# Patient Record
Sex: FEMALE | Race: WHITE | NOT HISPANIC OR LATINO
[De-identification: names, ages, dates, MRNs, and addresses within clinical notes are randomized per-mention and may not be internally consistent; named-entity substitution may affect disease eponyms.]

---

## 2022-09-01 ENCOUNTER — APPOINTMENT (OUTPATIENT)
Dept: OBGYN | Facility: CLINIC | Age: 57
End: 2022-09-01

## 2022-09-01 VITALS
HEIGHT: 65 IN | WEIGHT: 161 LBS | SYSTOLIC BLOOD PRESSURE: 103 MMHG | BODY MASS INDEX: 26.82 KG/M2 | HEART RATE: 78 BPM | DIASTOLIC BLOOD PRESSURE: 62 MMHG

## 2022-09-01 DIAGNOSIS — U07.1 COVID-19: ICD-10-CM

## 2022-09-01 DIAGNOSIS — Z00.00 ENCOUNTER FOR GENERAL ADULT MEDICAL EXAMINATION W/OUT ABNORMAL FINDINGS: ICD-10-CM

## 2022-09-01 PROCEDURE — 99396 PREV VISIT EST AGE 40-64: CPT

## 2022-09-01 NOTE — PLAN
[FreeTextEntry1] : Patient is a 57-year-old  0 last menstrual period 15 years ago\par Patient presents for annual visit,,, denies any complaints\par Physical exam reveals a well-developed well-nourished female in no apparent distress,,, BMI 26\par Heart regular rhythm and rate, lungs clear, breast no mass nontender no skin changes no nipple discharge no adenopathy, abdomen soft nontender no organomegaly\par Pelvic exam shows normal female external genitalia atrophic, vagina no lesions atrophic, cervix flush to the vaginal vault nontender, uterus anteverted small nontender, adnexa no mass nontender.\par Pap smear performed\par Prescription for breast mammogram and bone density given\par Patient states she had colonoscopy in 2022 with negative findings\par Patient states she had COVID diagnosis back in 2020 and then again in 2021,,, denies any residual symptoms or deficits, she has been both vaccinated and boosted\par Benign exam\par Follow-up 1 year or prior to that as needed

## 2022-09-01 NOTE — HISTORY OF PRESENT ILLNESS
[FreeTextEntry1] : Patient 57-year-old  0 last menstrual period 15 years ago\par Patient presents for annual visit,,, denies any complaints

## 2022-09-01 NOTE — PHYSICAL EXAM
[Chaperone Present] : A chaperone was present in the examining room during all aspects of the physical examination [Appropriately responsive] : appropriately responsive [Alert] : alert [No Acute Distress] : no acute distress [No Lymphadenopathy] : no lymphadenopathy [Regular Rate Rhythm] : regular rate rhythm [No Murmurs] : no murmurs [Clear to Auscultation B/L] : clear to auscultation bilaterally [Soft] : soft [Non-tender] : non-tender [Non-distended] : non-distended [No HSM] : No HSM [No Lesions] : no lesions [No Mass] : no mass [Oriented x3] : oriented x3 [Examination Of The Breasts] : a normal appearance [Vulvar Atrophy] : vulvar atrophy [Labia Majora] : normal [Labia Minora] : normal [Atrophy] : atrophy [Normal] : normal [Anteversion] : anteverted [Uterine Adnexae] : normal [FreeTextEntry6] : Masses, nontender, no skin changes, no nipple discharge, no adenopathy. [Tenderness] : nontender [Mass ___ cm] : no uterine mass was palpated

## 2022-09-23 LAB — HPV HIGH+LOW RISK DNA PNL CVX: NOT DETECTED

## 2023-09-12 ENCOUNTER — NON-APPOINTMENT (OUTPATIENT)
Age: 58
End: 2023-09-12

## 2023-09-12 ENCOUNTER — APPOINTMENT (OUTPATIENT)
Dept: OBGYN | Facility: CLINIC | Age: 58
End: 2023-09-12
Payer: COMMERCIAL

## 2023-09-12 VITALS
WEIGHT: 156 LBS | SYSTOLIC BLOOD PRESSURE: 111 MMHG | HEART RATE: 83 BPM | BODY MASS INDEX: 25.99 KG/M2 | DIASTOLIC BLOOD PRESSURE: 65 MMHG | HEIGHT: 65 IN

## 2023-09-12 DIAGNOSIS — R92.2 INCONCLUSIVE MAMMOGRAM: ICD-10-CM

## 2023-09-12 DIAGNOSIS — Z12.39 ENCOUNTER FOR OTHER SCREENING FOR MALIGNANT NEOPLASM OF BREAST: ICD-10-CM

## 2023-09-12 DIAGNOSIS — M85.80 OTHER SPECIFIED DISORDERS OF BONE DENSITY AND STRUCTURE, UNSPECIFIED SITE: ICD-10-CM

## 2023-09-12 DIAGNOSIS — Z01.419 ENCOUNTER FOR GYNECOLOGICAL EXAMINATION (GENERAL) (ROUTINE) W/OUT ABNORMAL FINDINGS: ICD-10-CM

## 2023-09-12 DIAGNOSIS — Z12.4 ENCOUNTER FOR SCREENING FOR MALIGNANT NEOPLASM OF CERVIX: ICD-10-CM

## 2023-09-12 DIAGNOSIS — Z11.51 ENCOUNTER FOR SCREENING FOR HUMAN PAPILLOMAVIRUS (HPV): ICD-10-CM

## 2023-09-12 DIAGNOSIS — Z12.31 ENCOUNTER FOR SCREENING MAMMOGRAM FOR MALIGNANT NEOPLASM OF BREAST: ICD-10-CM

## 2023-09-12 PROCEDURE — 99396 PREV VISIT EST AGE 40-64: CPT

## 2023-09-21 LAB — HPV HIGH+LOW RISK DNA PNL CVX: NOT DETECTED

## 2023-12-06 ENCOUNTER — INPATIENT (INPATIENT)
Facility: HOSPITAL | Age: 58
LOS: 2 days | Discharge: HOME | DRG: 443 | End: 2023-12-09
Attending: INTERNAL MEDICINE | Admitting: STUDENT IN AN ORGANIZED HEALTH CARE EDUCATION/TRAINING PROGRAM
Payer: COMMERCIAL

## 2023-12-06 VITALS
DIASTOLIC BLOOD PRESSURE: 78 MMHG | WEIGHT: 152.12 LBS | SYSTOLIC BLOOD PRESSURE: 127 MMHG | HEART RATE: 71 BPM | OXYGEN SATURATION: 99 % | HEIGHT: 65 IN | RESPIRATION RATE: 17 BRPM | TEMPERATURE: 98 F

## 2023-12-06 LAB
ALBUMIN SERPL ELPH-MCNC: 4.1 G/DL — SIGNIFICANT CHANGE UP (ref 3.3–5.2)
ALBUMIN SERPL ELPH-MCNC: 4.1 G/DL — SIGNIFICANT CHANGE UP (ref 3.3–5.2)
ALP SERPL-CCNC: 193 U/L — HIGH (ref 40–120)
ALP SERPL-CCNC: 193 U/L — HIGH (ref 40–120)
ALT FLD-CCNC: 191 U/L — HIGH
ALT FLD-CCNC: 191 U/L — HIGH
ANION GAP SERPL CALC-SCNC: 17 MMOL/L — SIGNIFICANT CHANGE UP (ref 5–17)
ANION GAP SERPL CALC-SCNC: 17 MMOL/L — SIGNIFICANT CHANGE UP (ref 5–17)
APAP SERPL-MCNC: <3 UG/ML — LOW (ref 10–26)
APAP SERPL-MCNC: <3 UG/ML — LOW (ref 10–26)
APPEARANCE UR: ABNORMAL
APPEARANCE UR: ABNORMAL
APTT BLD: 31.8 SEC — SIGNIFICANT CHANGE UP (ref 24.5–35.6)
APTT BLD: 31.8 SEC — SIGNIFICANT CHANGE UP (ref 24.5–35.6)
AST SERPL-CCNC: 160 U/L — HIGH
AST SERPL-CCNC: 160 U/L — HIGH
BACTERIA # UR AUTO: NEGATIVE /HPF — SIGNIFICANT CHANGE UP
BACTERIA # UR AUTO: NEGATIVE /HPF — SIGNIFICANT CHANGE UP
BASOPHILS # BLD AUTO: 0.06 K/UL — SIGNIFICANT CHANGE UP (ref 0–0.2)
BASOPHILS # BLD AUTO: 0.06 K/UL — SIGNIFICANT CHANGE UP (ref 0–0.2)
BASOPHILS NFR BLD AUTO: 1.5 % — SIGNIFICANT CHANGE UP (ref 0–2)
BASOPHILS NFR BLD AUTO: 1.5 % — SIGNIFICANT CHANGE UP (ref 0–2)
BILIRUB SERPL-MCNC: 7.2 MG/DL — HIGH (ref 0.4–2)
BILIRUB SERPL-MCNC: 7.2 MG/DL — HIGH (ref 0.4–2)
BILIRUB UR-MCNC: NEGATIVE — SIGNIFICANT CHANGE UP
BILIRUB UR-MCNC: NEGATIVE — SIGNIFICANT CHANGE UP
BLD GP AB SCN SERPL QL: SIGNIFICANT CHANGE UP
BLD GP AB SCN SERPL QL: SIGNIFICANT CHANGE UP
BUN SERPL-MCNC: 10.4 MG/DL — SIGNIFICANT CHANGE UP (ref 8–20)
BUN SERPL-MCNC: 10.4 MG/DL — SIGNIFICANT CHANGE UP (ref 8–20)
CALCIUM SERPL-MCNC: 9.8 MG/DL — SIGNIFICANT CHANGE UP (ref 8.4–10.5)
CALCIUM SERPL-MCNC: 9.8 MG/DL — SIGNIFICANT CHANGE UP (ref 8.4–10.5)
CAST: 0 /LPF — SIGNIFICANT CHANGE UP (ref 0–4)
CAST: 0 /LPF — SIGNIFICANT CHANGE UP (ref 0–4)
CHLORIDE SERPL-SCNC: 98 MMOL/L — SIGNIFICANT CHANGE UP (ref 96–108)
CHLORIDE SERPL-SCNC: 98 MMOL/L — SIGNIFICANT CHANGE UP (ref 96–108)
CO2 SERPL-SCNC: 20 MMOL/L — LOW (ref 22–29)
CO2 SERPL-SCNC: 20 MMOL/L — LOW (ref 22–29)
COLOR SPEC: YELLOW — SIGNIFICANT CHANGE UP
COLOR SPEC: YELLOW — SIGNIFICANT CHANGE UP
CREAT SERPL-MCNC: 0.44 MG/DL — LOW (ref 0.5–1.3)
CREAT SERPL-MCNC: 0.44 MG/DL — LOW (ref 0.5–1.3)
DIFF PNL FLD: NEGATIVE — SIGNIFICANT CHANGE UP
DIFF PNL FLD: NEGATIVE — SIGNIFICANT CHANGE UP
EGFR: 112 ML/MIN/1.73M2 — SIGNIFICANT CHANGE UP
EGFR: 112 ML/MIN/1.73M2 — SIGNIFICANT CHANGE UP
EOSINOPHIL # BLD AUTO: 0.15 K/UL — SIGNIFICANT CHANGE UP (ref 0–0.5)
EOSINOPHIL # BLD AUTO: 0.15 K/UL — SIGNIFICANT CHANGE UP (ref 0–0.5)
EOSINOPHIL NFR BLD AUTO: 3.8 % — SIGNIFICANT CHANGE UP (ref 0–6)
EOSINOPHIL NFR BLD AUTO: 3.8 % — SIGNIFICANT CHANGE UP (ref 0–6)
GLUCOSE SERPL-MCNC: 98 MG/DL — SIGNIFICANT CHANGE UP (ref 70–99)
GLUCOSE SERPL-MCNC: 98 MG/DL — SIGNIFICANT CHANGE UP (ref 70–99)
GLUCOSE UR QL: NEGATIVE MG/DL — SIGNIFICANT CHANGE UP
GLUCOSE UR QL: NEGATIVE MG/DL — SIGNIFICANT CHANGE UP
HCT VFR BLD CALC: 38.2 % — SIGNIFICANT CHANGE UP (ref 34.5–45)
HCT VFR BLD CALC: 38.2 % — SIGNIFICANT CHANGE UP (ref 34.5–45)
HGB BLD-MCNC: 13.3 G/DL — SIGNIFICANT CHANGE UP (ref 11.5–15.5)
HGB BLD-MCNC: 13.3 G/DL — SIGNIFICANT CHANGE UP (ref 11.5–15.5)
HIV 1 & 2 AB SERPL IA.RAPID: SIGNIFICANT CHANGE UP
HIV 1 & 2 AB SERPL IA.RAPID: SIGNIFICANT CHANGE UP
IMM GRANULOCYTES NFR BLD AUTO: 0.3 % — SIGNIFICANT CHANGE UP (ref 0–0.9)
IMM GRANULOCYTES NFR BLD AUTO: 0.3 % — SIGNIFICANT CHANGE UP (ref 0–0.9)
INR BLD: 0.84 RATIO — LOW (ref 0.85–1.18)
INR BLD: 0.84 RATIO — LOW (ref 0.85–1.18)
KETONES UR-MCNC: NEGATIVE MG/DL — SIGNIFICANT CHANGE UP
KETONES UR-MCNC: NEGATIVE MG/DL — SIGNIFICANT CHANGE UP
LEUKOCYTE ESTERASE UR-ACNC: ABNORMAL
LEUKOCYTE ESTERASE UR-ACNC: ABNORMAL
LIDOCAIN IGE QN: 28 U/L — SIGNIFICANT CHANGE UP (ref 22–51)
LIDOCAIN IGE QN: 28 U/L — SIGNIFICANT CHANGE UP (ref 22–51)
LYMPHOCYTES # BLD AUTO: 1.17 K/UL — SIGNIFICANT CHANGE UP (ref 1–3.3)
LYMPHOCYTES # BLD AUTO: 1.17 K/UL — SIGNIFICANT CHANGE UP (ref 1–3.3)
LYMPHOCYTES # BLD AUTO: 29.6 % — SIGNIFICANT CHANGE UP (ref 13–44)
LYMPHOCYTES # BLD AUTO: 29.6 % — SIGNIFICANT CHANGE UP (ref 13–44)
MCHC RBC-ENTMCNC: 29.6 PG — SIGNIFICANT CHANGE UP (ref 27–34)
MCHC RBC-ENTMCNC: 29.6 PG — SIGNIFICANT CHANGE UP (ref 27–34)
MCHC RBC-ENTMCNC: 34.8 GM/DL — SIGNIFICANT CHANGE UP (ref 32–36)
MCHC RBC-ENTMCNC: 34.8 GM/DL — SIGNIFICANT CHANGE UP (ref 32–36)
MCV RBC AUTO: 85.1 FL — SIGNIFICANT CHANGE UP (ref 80–100)
MCV RBC AUTO: 85.1 FL — SIGNIFICANT CHANGE UP (ref 80–100)
MONOCYTES # BLD AUTO: 0.51 K/UL — SIGNIFICANT CHANGE UP (ref 0–0.9)
MONOCYTES # BLD AUTO: 0.51 K/UL — SIGNIFICANT CHANGE UP (ref 0–0.9)
MONOCYTES NFR BLD AUTO: 12.9 % — SIGNIFICANT CHANGE UP (ref 2–14)
MONOCYTES NFR BLD AUTO: 12.9 % — SIGNIFICANT CHANGE UP (ref 2–14)
NEUTROPHILS # BLD AUTO: 2.05 K/UL — SIGNIFICANT CHANGE UP (ref 1.8–7.4)
NEUTROPHILS # BLD AUTO: 2.05 K/UL — SIGNIFICANT CHANGE UP (ref 1.8–7.4)
NEUTROPHILS NFR BLD AUTO: 51.9 % — SIGNIFICANT CHANGE UP (ref 43–77)
NEUTROPHILS NFR BLD AUTO: 51.9 % — SIGNIFICANT CHANGE UP (ref 43–77)
NITRITE UR-MCNC: NEGATIVE — SIGNIFICANT CHANGE UP
NITRITE UR-MCNC: NEGATIVE — SIGNIFICANT CHANGE UP
PH UR: 6.5 — SIGNIFICANT CHANGE UP (ref 5–8)
PH UR: 6.5 — SIGNIFICANT CHANGE UP (ref 5–8)
PLATELET # BLD AUTO: 267 K/UL — SIGNIFICANT CHANGE UP (ref 150–400)
PLATELET # BLD AUTO: 267 K/UL — SIGNIFICANT CHANGE UP (ref 150–400)
POTASSIUM SERPL-MCNC: 4.5 MMOL/L — SIGNIFICANT CHANGE UP (ref 3.5–5.3)
POTASSIUM SERPL-MCNC: 4.5 MMOL/L — SIGNIFICANT CHANGE UP (ref 3.5–5.3)
POTASSIUM SERPL-SCNC: 4.5 MMOL/L — SIGNIFICANT CHANGE UP (ref 3.5–5.3)
POTASSIUM SERPL-SCNC: 4.5 MMOL/L — SIGNIFICANT CHANGE UP (ref 3.5–5.3)
PROT SERPL-MCNC: 7.2 G/DL — SIGNIFICANT CHANGE UP (ref 6.6–8.7)
PROT SERPL-MCNC: 7.2 G/DL — SIGNIFICANT CHANGE UP (ref 6.6–8.7)
PROT UR-MCNC: NEGATIVE MG/DL — SIGNIFICANT CHANGE UP
PROT UR-MCNC: NEGATIVE MG/DL — SIGNIFICANT CHANGE UP
PROTHROM AB SERPL-ACNC: 9.4 SEC — LOW (ref 9.5–13)
PROTHROM AB SERPL-ACNC: 9.4 SEC — LOW (ref 9.5–13)
RBC # BLD: 4.49 M/UL — SIGNIFICANT CHANGE UP (ref 3.8–5.2)
RBC # BLD: 4.49 M/UL — SIGNIFICANT CHANGE UP (ref 3.8–5.2)
RBC # FLD: 13.5 % — SIGNIFICANT CHANGE UP (ref 10.3–14.5)
RBC # FLD: 13.5 % — SIGNIFICANT CHANGE UP (ref 10.3–14.5)
RBC CASTS # UR COMP ASSIST: 6 /HPF — HIGH (ref 0–4)
RBC CASTS # UR COMP ASSIST: 6 /HPF — HIGH (ref 0–4)
SALICYLATES SERPL-MCNC: <0.6 MG/DL — LOW (ref 10–20)
SALICYLATES SERPL-MCNC: <0.6 MG/DL — LOW (ref 10–20)
SODIUM SERPL-SCNC: 135 MMOL/L — SIGNIFICANT CHANGE UP (ref 135–145)
SODIUM SERPL-SCNC: 135 MMOL/L — SIGNIFICANT CHANGE UP (ref 135–145)
SP GR SPEC: 1.01 — SIGNIFICANT CHANGE UP (ref 1–1.03)
SP GR SPEC: 1.01 — SIGNIFICANT CHANGE UP (ref 1–1.03)
SQUAMOUS # UR AUTO: 1 /HPF — SIGNIFICANT CHANGE UP (ref 0–5)
SQUAMOUS # UR AUTO: 1 /HPF — SIGNIFICANT CHANGE UP (ref 0–5)
UROBILINOGEN FLD QL: 1 MG/DL — SIGNIFICANT CHANGE UP (ref 0.2–1)
UROBILINOGEN FLD QL: 1 MG/DL — SIGNIFICANT CHANGE UP (ref 0.2–1)
WBC # BLD: 3.95 K/UL — SIGNIFICANT CHANGE UP (ref 3.8–10.5)
WBC # BLD: 3.95 K/UL — SIGNIFICANT CHANGE UP (ref 3.8–10.5)
WBC # FLD AUTO: 3.95 K/UL — SIGNIFICANT CHANGE UP (ref 3.8–10.5)
WBC # FLD AUTO: 3.95 K/UL — SIGNIFICANT CHANGE UP (ref 3.8–10.5)
WBC UR QL: 9 /HPF — HIGH (ref 0–5)
WBC UR QL: 9 /HPF — HIGH (ref 0–5)

## 2023-12-06 PROCEDURE — 99285 EMERGENCY DEPT VISIT HI MDM: CPT

## 2023-12-06 RX ORDER — DIPHENHYDRAMINE HCL 50 MG
25 CAPSULE ORAL ONCE
Refills: 0 | Status: COMPLETED | OUTPATIENT
Start: 2023-12-06 | End: 2023-12-06

## 2023-12-06 RX ORDER — CEFTRIAXONE 500 MG/1
1000 INJECTION, POWDER, FOR SOLUTION INTRAMUSCULAR; INTRAVENOUS ONCE
Refills: 0 | Status: DISCONTINUED | OUTPATIENT
Start: 2023-12-06 | End: 2023-12-06

## 2023-12-06 RX ORDER — CEFTRIAXONE 500 MG/1
1000 INJECTION, POWDER, FOR SOLUTION INTRAMUSCULAR; INTRAVENOUS ONCE
Refills: 0 | Status: COMPLETED | OUTPATIENT
Start: 2023-12-06 | End: 2023-12-06

## 2023-12-06 RX ADMIN — Medication 25 MILLIGRAM(S): at 18:46

## 2023-12-06 RX ADMIN — Medication 25 MILLIGRAM(S): at 22:55

## 2023-12-06 NOTE — ED ADULT NURSE REASSESSMENT NOTE - NS ED NURSE REASSESS COMMENT FT1
assumed care pf pt around 1930 from NI Clemens. Pt a&ox3 in no acute distress, resp even and nonlabored. Pt reports itching has resolved after benadryl given earlier. Pt denies any pain, SOB, or dizziness. Jaundice noticed on pt's sclera and skin. Pt informed of plan of care, resting comfortably in bed.

## 2023-12-06 NOTE — ED ADULT NURSE REASSESSMENT NOTE - NS ED NURSE REASSESS COMMENT FT1
upon RN reassessment, pt reports feeling itchy throughout entire body again. SHERLY Quiñones made aware of issue, Benadryl 25mg given. Pt in no acute distress, airway patent, denies SOB/chest pain/dizziness. Resps even and nonlabored.

## 2023-12-06 NOTE — ED ADULT NURSE NOTE - OBJECTIVE STATEMENT
Pt states for the past week her urine has been darker than normal and states this morning she woke up and her eyes and skin was yellow tinged

## 2023-12-06 NOTE — ED PROVIDER NOTE - OBJECTIVE STATEMENT
58-year-old female no known significant past medical history presents emergency department complaining of jaundice.  Patient states that 2 weeks ago she had a viral-like illness which she believed was the flu.  Shortly after that she noticed that her urine was dark and that she felt very itchy all over her body.  While at work today her coworkers advised her that she appeared yellow and she became concerned and came to the emergency department.  Patient denies any heavy alcohol use.  Denies using excessive amounts of Tylenol.  States she does take aspirin.  Patient reports she takes herbal supplements Jovan, [employer], and goldenrod.  She denies any abdominal pain, nausea, or vomiting at this time.  She states when she was initially sick 2 weeks ago she had some nausea and epigastric pain.  She also notes a 8 pound weight loss over the past 1 week.

## 2023-12-06 NOTE — ED ADULT NURSE NOTE - NSFALLUNIVINTERV_ED_ALL_ED
Bed/Stretcher in lowest position, wheels locked, appropriate side rails in place/Call bell, personal items and telephone in reach/Instruct patient to call for assistance before getting out of bed/chair/stretcher/Non-slip footwear applied when patient is off stretcher/[place] to call system/Physically safe environment - no spills, clutter or unnecessary equipment/Purposeful proactive rounding/Room/bathroom lighting operational, light cord in reach Bed/Stretcher in lowest position, wheels locked, appropriate side rails in place/Call bell, personal items and telephone in reach/Instruct patient to call for assistance before getting out of bed/chair/stretcher/Non-slip footwear applied when patient is off stretcher/[place] to call system/Physically safe environment - no spills, clutter or unnecessary equipment/Purposeful proactive rounding/Room/bathroom lighting operational, light cord in reach

## 2023-12-06 NOTE — ED PROVIDER NOTE - ATTENDING APP SHARED VISIT CONTRIBUTION OF CARE
The patient discussed with ACP    Jaundice    I, Giuseppe Waller, performed the initial face to face bedside interview with this patient regarding history of present illness, review of symptoms and relevant past medical, social and family history.  I completed an independent physical examination.  I was the initial provider who evaluated this patient. I have signed out the follow up of any pending tests (i.e. labs, radiological studies) to the ACP.  I have communicated the patient’s plan of care and disposition with the ACP.

## 2023-12-06 NOTE — ED PROVIDER NOTE - CLINICAL SUMMARY MEDICAL DECISION MAKING FREE TEXT BOX
58-year-old female no known significant past medical history presents emergency department complaining of jaundice. Had viral like symptoms 2 wks ago. Labs significant  for elevated direct and total bilirubin, LFT'S. CT A/P negative for acute pathology. GI consult placed. pt to be admitted to medicine for further eval and management.

## 2023-12-06 NOTE — ED ADULT TRIAGE NOTE - CHIEF COMPLAINT QUOTE
pt states she has had very dark urine, itchiness to entire body over the past week. today noticed her eyes were yellow

## 2023-12-06 NOTE — ED PROVIDER NOTE - PROGRESS NOTE DETAILS
received signout from SHERLY Purcell pending imaging pt to be admitted to medicine for further eval and management

## 2023-12-06 NOTE — ED ADULT NURSE NOTE - CAS EDP DISCH DISPOSITION ADMI
[facility], [address] / [facility], [address]/[facility]

## 2023-12-07 DIAGNOSIS — R17 UNSPECIFIED JAUNDICE: ICD-10-CM

## 2023-12-07 LAB
ALBUMIN SERPL ELPH-MCNC: 3.8 G/DL — SIGNIFICANT CHANGE UP (ref 3.3–5.2)
ALBUMIN SERPL ELPH-MCNC: 3.8 G/DL — SIGNIFICANT CHANGE UP (ref 3.3–5.2)
ALP SERPL-CCNC: 191 U/L — HIGH (ref 40–120)
ALP SERPL-CCNC: 191 U/L — HIGH (ref 40–120)
ALT FLD-CCNC: 185 U/L — HIGH
ALT FLD-CCNC: 185 U/L — HIGH
ANION GAP SERPL CALC-SCNC: 15 MMOL/L — SIGNIFICANT CHANGE UP (ref 5–17)
ANION GAP SERPL CALC-SCNC: 15 MMOL/L — SIGNIFICANT CHANGE UP (ref 5–17)
APTT BLD: 31.1 SEC — SIGNIFICANT CHANGE UP (ref 24.5–35.6)
APTT BLD: 31.1 SEC — SIGNIFICANT CHANGE UP (ref 24.5–35.6)
AST SERPL-CCNC: 157 U/L — HIGH
AST SERPL-CCNC: 157 U/L — HIGH
BASOPHILS # BLD AUTO: 0.05 K/UL — SIGNIFICANT CHANGE UP (ref 0–0.2)
BASOPHILS # BLD AUTO: 0.05 K/UL — SIGNIFICANT CHANGE UP (ref 0–0.2)
BASOPHILS NFR BLD AUTO: 1.3 % — SIGNIFICANT CHANGE UP (ref 0–2)
BASOPHILS NFR BLD AUTO: 1.3 % — SIGNIFICANT CHANGE UP (ref 0–2)
BILIRUB DIRECT SERPL-MCNC: 5.9 MG/DL — HIGH (ref 0–0.3)
BILIRUB DIRECT SERPL-MCNC: 5.9 MG/DL — HIGH (ref 0–0.3)
BILIRUB SERPL-MCNC: 7.2 MG/DL — HIGH (ref 0.4–2)
BILIRUB SERPL-MCNC: 7.2 MG/DL — HIGH (ref 0.4–2)
BUN SERPL-MCNC: 8.2 MG/DL — SIGNIFICANT CHANGE UP (ref 8–20)
BUN SERPL-MCNC: 8.2 MG/DL — SIGNIFICANT CHANGE UP (ref 8–20)
CALCIUM SERPL-MCNC: 9.4 MG/DL — SIGNIFICANT CHANGE UP (ref 8.4–10.5)
CALCIUM SERPL-MCNC: 9.4 MG/DL — SIGNIFICANT CHANGE UP (ref 8.4–10.5)
CHLORIDE SERPL-SCNC: 101 MMOL/L — SIGNIFICANT CHANGE UP (ref 96–108)
CHLORIDE SERPL-SCNC: 101 MMOL/L — SIGNIFICANT CHANGE UP (ref 96–108)
CO2 SERPL-SCNC: 22 MMOL/L — SIGNIFICANT CHANGE UP (ref 22–29)
CO2 SERPL-SCNC: 22 MMOL/L — SIGNIFICANT CHANGE UP (ref 22–29)
CREAT SERPL-MCNC: 0.61 MG/DL — SIGNIFICANT CHANGE UP (ref 0.5–1.3)
CREAT SERPL-MCNC: 0.61 MG/DL — SIGNIFICANT CHANGE UP (ref 0.5–1.3)
EGFR: 104 ML/MIN/1.73M2 — SIGNIFICANT CHANGE UP
EGFR: 104 ML/MIN/1.73M2 — SIGNIFICANT CHANGE UP
EOSINOPHIL # BLD AUTO: 0.15 K/UL — SIGNIFICANT CHANGE UP (ref 0–0.5)
EOSINOPHIL # BLD AUTO: 0.15 K/UL — SIGNIFICANT CHANGE UP (ref 0–0.5)
EOSINOPHIL NFR BLD AUTO: 3.9 % — SIGNIFICANT CHANGE UP (ref 0–6)
EOSINOPHIL NFR BLD AUTO: 3.9 % — SIGNIFICANT CHANGE UP (ref 0–6)
GLUCOSE SERPL-MCNC: 89 MG/DL — SIGNIFICANT CHANGE UP (ref 70–99)
GLUCOSE SERPL-MCNC: 89 MG/DL — SIGNIFICANT CHANGE UP (ref 70–99)
HCT VFR BLD CALC: 39.6 % — SIGNIFICANT CHANGE UP (ref 34.5–45)
HCT VFR BLD CALC: 39.6 % — SIGNIFICANT CHANGE UP (ref 34.5–45)
HGB BLD-MCNC: 13.8 G/DL — SIGNIFICANT CHANGE UP (ref 11.5–15.5)
HGB BLD-MCNC: 13.8 G/DL — SIGNIFICANT CHANGE UP (ref 11.5–15.5)
IMM GRANULOCYTES NFR BLD AUTO: 0.3 % — SIGNIFICANT CHANGE UP (ref 0–0.9)
IMM GRANULOCYTES NFR BLD AUTO: 0.3 % — SIGNIFICANT CHANGE UP (ref 0–0.9)
INR BLD: 0.85 RATIO — SIGNIFICANT CHANGE UP (ref 0.85–1.18)
INR BLD: 0.85 RATIO — SIGNIFICANT CHANGE UP (ref 0.85–1.18)
LYMPHOCYTES # BLD AUTO: 1.15 K/UL — SIGNIFICANT CHANGE UP (ref 1–3.3)
LYMPHOCYTES # BLD AUTO: 1.15 K/UL — SIGNIFICANT CHANGE UP (ref 1–3.3)
LYMPHOCYTES # BLD AUTO: 29.8 % — SIGNIFICANT CHANGE UP (ref 13–44)
LYMPHOCYTES # BLD AUTO: 29.8 % — SIGNIFICANT CHANGE UP (ref 13–44)
MCHC RBC-ENTMCNC: 29.6 PG — SIGNIFICANT CHANGE UP (ref 27–34)
MCHC RBC-ENTMCNC: 29.6 PG — SIGNIFICANT CHANGE UP (ref 27–34)
MCHC RBC-ENTMCNC: 34.8 GM/DL — SIGNIFICANT CHANGE UP (ref 32–36)
MCHC RBC-ENTMCNC: 34.8 GM/DL — SIGNIFICANT CHANGE UP (ref 32–36)
MCV RBC AUTO: 85 FL — SIGNIFICANT CHANGE UP (ref 80–100)
MCV RBC AUTO: 85 FL — SIGNIFICANT CHANGE UP (ref 80–100)
MONOCYTES # BLD AUTO: 0.51 K/UL — SIGNIFICANT CHANGE UP (ref 0–0.9)
MONOCYTES # BLD AUTO: 0.51 K/UL — SIGNIFICANT CHANGE UP (ref 0–0.9)
MONOCYTES NFR BLD AUTO: 13.2 % — SIGNIFICANT CHANGE UP (ref 2–14)
MONOCYTES NFR BLD AUTO: 13.2 % — SIGNIFICANT CHANGE UP (ref 2–14)
NEUTROPHILS # BLD AUTO: 1.99 K/UL — SIGNIFICANT CHANGE UP (ref 1.8–7.4)
NEUTROPHILS # BLD AUTO: 1.99 K/UL — SIGNIFICANT CHANGE UP (ref 1.8–7.4)
NEUTROPHILS NFR BLD AUTO: 51.5 % — SIGNIFICANT CHANGE UP (ref 43–77)
NEUTROPHILS NFR BLD AUTO: 51.5 % — SIGNIFICANT CHANGE UP (ref 43–77)
PLATELET # BLD AUTO: 292 K/UL — SIGNIFICANT CHANGE UP (ref 150–400)
PLATELET # BLD AUTO: 292 K/UL — SIGNIFICANT CHANGE UP (ref 150–400)
POTASSIUM SERPL-MCNC: 4.2 MMOL/L — SIGNIFICANT CHANGE UP (ref 3.5–5.3)
POTASSIUM SERPL-MCNC: 4.2 MMOL/L — SIGNIFICANT CHANGE UP (ref 3.5–5.3)
POTASSIUM SERPL-SCNC: 4.2 MMOL/L — SIGNIFICANT CHANGE UP (ref 3.5–5.3)
POTASSIUM SERPL-SCNC: 4.2 MMOL/L — SIGNIFICANT CHANGE UP (ref 3.5–5.3)
PROT SERPL-MCNC: 6.9 G/DL — SIGNIFICANT CHANGE UP (ref 6.6–8.7)
PROT SERPL-MCNC: 6.9 G/DL — SIGNIFICANT CHANGE UP (ref 6.6–8.7)
PROTHROM AB SERPL-ACNC: 9.5 SEC — SIGNIFICANT CHANGE UP (ref 9.5–13)
PROTHROM AB SERPL-ACNC: 9.5 SEC — SIGNIFICANT CHANGE UP (ref 9.5–13)
RBC # BLD: 4.66 M/UL — SIGNIFICANT CHANGE UP (ref 3.8–5.2)
RBC # BLD: 4.66 M/UL — SIGNIFICANT CHANGE UP (ref 3.8–5.2)
RBC # FLD: 13.7 % — SIGNIFICANT CHANGE UP (ref 10.3–14.5)
RBC # FLD: 13.7 % — SIGNIFICANT CHANGE UP (ref 10.3–14.5)
SODIUM SERPL-SCNC: 138 MMOL/L — SIGNIFICANT CHANGE UP (ref 135–145)
SODIUM SERPL-SCNC: 138 MMOL/L — SIGNIFICANT CHANGE UP (ref 135–145)
WBC # BLD: 3.86 K/UL — SIGNIFICANT CHANGE UP (ref 3.8–10.5)
WBC # BLD: 3.86 K/UL — SIGNIFICANT CHANGE UP (ref 3.8–10.5)
WBC # FLD AUTO: 3.86 K/UL — SIGNIFICANT CHANGE UP (ref 3.8–10.5)
WBC # FLD AUTO: 3.86 K/UL — SIGNIFICANT CHANGE UP (ref 3.8–10.5)

## 2023-12-07 PROCEDURE — 74177 CT ABD & PELVIS W/CONTRAST: CPT | Mod: 26,MA

## 2023-12-07 PROCEDURE — 99223 1ST HOSP IP/OBS HIGH 75: CPT

## 2023-12-07 RX ORDER — ACETAMINOPHEN 500 MG
650 TABLET ORAL EVERY 6 HOURS
Refills: 0 | Status: DISCONTINUED | OUTPATIENT
Start: 2023-12-07 | End: 2023-12-09

## 2023-12-07 RX ORDER — ACETYLCYSTEINE 200 MG/ML
10 VIAL (ML) MISCELLANEOUS EVERY 24 HOURS
Refills: 0 | Status: DISCONTINUED | OUTPATIENT
Start: 2023-12-07 | End: 2023-12-09

## 2023-12-07 RX ORDER — ACETYLCYSTEINE 200 MG/ML
10 VIAL (ML) MISCELLANEOUS EVERY 24 HOURS
Refills: 0 | Status: DISCONTINUED | OUTPATIENT
Start: 2023-12-07 | End: 2023-12-07

## 2023-12-07 RX ORDER — ACETYLCYSTEINE 200 MG/ML
10 VIAL (ML) MISCELLANEOUS ONCE
Refills: 0 | Status: COMPLETED | OUTPATIENT
Start: 2023-12-07 | End: 2023-12-07

## 2023-12-07 RX ORDER — INFLUENZA VIRUS VACCINE 15; 15; 15; 15 UG/.5ML; UG/.5ML; UG/.5ML; UG/.5ML
0.5 SUSPENSION INTRAMUSCULAR ONCE
Refills: 0 | Status: DISCONTINUED | OUTPATIENT
Start: 2023-12-07 | End: 2023-12-09

## 2023-12-07 RX ORDER — ACETYLCYSTEINE 200 MG/ML
3.5 VIAL (ML) MISCELLANEOUS ONCE
Refills: 0 | Status: COMPLETED | OUTPATIENT
Start: 2023-12-07 | End: 2023-12-07

## 2023-12-07 RX ORDER — ONDANSETRON 8 MG/1
4 TABLET, FILM COATED ORAL EVERY 6 HOURS
Refills: 0 | Status: DISCONTINUED | OUTPATIENT
Start: 2023-12-07 | End: 2023-12-09

## 2023-12-07 RX ADMIN — Medication 250 GRAM(S): at 11:01

## 2023-12-07 RX ADMIN — Medication 41.67 GRAM(S): at 19:23

## 2023-12-07 RX ADMIN — CEFTRIAXONE 1000 MILLIGRAM(S): 500 INJECTION, POWDER, FOR SOLUTION INTRAMUSCULAR; INTRAVENOUS at 00:53

## 2023-12-07 RX ADMIN — Medication 125 GRAM(S): at 12:04

## 2023-12-07 RX ADMIN — ONDANSETRON 4 MILLIGRAM(S): 8 TABLET, FILM COATED ORAL at 13:00

## 2023-12-07 NOTE — H&P ADULT - NSHPPHYSICALEXAM_GEN_ALL_CORE
Vital Signs Last 24 Hrs  T(C): 36.6 (07 Dec 2023 06:39), Max: 36.8 (07 Dec 2023 06:21)  T(F): 97.8 (07 Dec 2023 06:39), Max: 98.2 (07 Dec 2023 06:21)  HR: 72 (07 Dec 2023 06:39) (67 - 72)  BP: 117/71 (07 Dec 2023 06:39) (115/73 - 127/78)  BP(mean): --  RR: 18 (07 Dec 2023 06:39) (16 - 18)  SpO2: 96% (07 Dec 2023 06:39) (96% - 99%)    Parameters below as of 07 Dec 2023 06:39  Patient On (Oxygen Delivery Method): room air    GENERAL:  Well-appearing, not in acute distress, mild jaundice  EYES:  Scleral icterus, extraocular movement intact  ENT: Moist mucous membranes  RESP:  Non-labored breathing pattern, lungs clear to ausculation in anterior fields  CV: Regular rate and rhythm, no murmurs appreciated, no lower extremity edema  GI: Soft, non-tender, non-distended  NEURO: Awake, alert, conversant, upper and lower extremity strength 5/5, light touch sensation grossly intact  PSYCH: Calm, cooperative  SKIN: Jaundice, warm and dry

## 2023-12-07 NOTE — CHART NOTE - NSCHARTNOTEFT_GEN_A_CORE
H+P reviewed from this AM.  Patient admitted for pruritis and painless jaundice.    58W no sig PMH presents for jaundice, found to have abnormal LFTs.    CT without evidence of malignancy or obstruction    - pending MRCP  - NPO for now  - review verbal supplements  - hepatitis and tick borne panel pending  - GI consulted

## 2023-12-07 NOTE — H&P ADULT - HISTORY OF PRESENT ILLNESS
58yoF no PMHx presenting with pruritis and jaundice.  Pt reports several days of generalized itching and dark urine that began after she had viral upper respiratory illness about 2 weeks ago.  Pt decided to come to the hospital today after her coworkers told her that she looked yellow.  Pt denies heavy alcohol consumptions, excessive Tylenol use but admits to taking various herbal supplements. Pt reports prior abdominal pain when she was sick with the viral syndrome a few weeks ago.  Pt denies fevers, chills, nausea, vomiting, diarrhea or active abdominal pain.

## 2023-12-07 NOTE — H&P ADULT - ASSESSMENT
58yoF no PMHx but takes multiple herbal supplements, recent viral upper respiratory illness 2 weeks ago, followed by onset of pruritis, jaundice and dark yellow, on admission found to have hyperbilirubinemia and elevated AST/ALT    Hyperbilirubinemia/elevated LFTs  -Suspicion for drug related liver injury as pt takes multiple supplements/herbs  -CT A/P shows no liver or hepatobiliary pathology  -MRCP ordered  -NPO except meds prior to MRCP  -Hepatitis and tick-borne panel pending in lab  -Trend LFTs  -GI consulted by ED for AM    Prophylactic measure  -No pharmacologic AC as low risk in event pt undergoes ERCP this admission  -Intermittent pneumatic compressions

## 2023-12-07 NOTE — CONSULT NOTE ADULT - NS ATTEND AMEND GEN_ALL_CORE FT
Ms. Schulz is a 58 year old woman with no significant past medical history who presents with acute onset jaundice (painless). She denied any previous episodes or prior knowledge of liver disease, no family history of liver disease, She denied taking over the counter medications / Tylenol etc. No dietary supplementations. She does endorse several herbal supplements (home made), she forages for ingredients ([employer] / [place] / [employer]), She denies alcohol use, no illicit drug use. CT without obstruction. She did admit to a recent viral illness (awaiting hepatitis labs). Concern for potential toxin exposure given history of foraging for mushrooms and natural herbs although can not rule out acute viral etiologies. Discussed with patient, provider and hepatology team. Interpreted images and labs reviewed in detail. All questions answered and concerns addressed.     Elevated liver chemistries / painless jaundice   Acute hepatitis of unclear etiology     Plan:   Agree with plan as outlined above.   Would follow up hepatitis work up.   Would defer MRCP given limited evidence to suggest biliary obstruction.   Avoid further hepatotoxic agents.   Continue to trend liver chemistries including PT/INR.   Agree with empiric NAC therapy.   We will continue to follow along closely. Ms. Schulz is a 58 year old woman with no significant past medical history who presents with acute onset jaundice (painless). She denied any previous episodes or prior knowledge of liver disease, no family history of liver disease, She denied taking over the counter medications / Tylenol etc. No dietary supplementations. She does endorse several herbal supplements (home made), she forages for ingredients ([place] / [place] / [employer]), She denies alcohol use, no illicit drug use. CT without obstruction. She did admit to a recent viral illness (awaiting hepatitis labs). Concern for potential toxin exposure given history of foraging for mushrooms and natural herbs although can not rule out acute viral etiologies. Discussed with patient, provider and hepatology team. Interpreted images and labs reviewed in detail. All questions answered and concerns addressed.     Elevated liver chemistries / painless jaundice   Acute hepatitis of unclear etiology     Plan:   Agree with plan as outlined above.   Would follow up hepatitis work up.   Would defer MRCP given limited evidence to suggest biliary obstruction.   Avoid further hepatotoxic agents.   Continue to trend liver chemistries including PT/INR.   Agree with empiric NAC therapy.   We will continue to follow along closely.

## 2023-12-07 NOTE — PATIENT PROFILE ADULT - FALL HARM RISK - UNIVERSAL INTERVENTIONS
Bed in lowest position, wheels locked, appropriate side rails in place/Call bell, personal items and telephone in reach/Instruct patient to call for assistance before getting out of bed or chair/Non-slip footwear when patient is out of bed/[place] to call system/Physically safe environment - no spills, clutter or unnecessary equipment/Purposeful Proactive Rounding/Room/bathroom lighting operational, light cord in reach Bed in lowest position, wheels locked, appropriate side rails in place/Call bell, personal items and telephone in reach/Instruct patient to call for assistance before getting out of bed or chair/Non-slip footwear when patient is out of bed/[place] to call system/Physically safe environment - no spills, clutter or unnecessary equipment/Purposeful Proactive Rounding/Room/bathroom lighting operational, light cord in reach

## 2023-12-07 NOTE — CONSULT NOTE ADULT - SUBJECTIVE AND OBJECTIVE BOX
Chief Complaint:  Patient is a 58y old  Female who presents with a chief complaint of Elevated LFTs (07 Dec 2023 06:07)        Patient is a 58y old  Female who presents with a chief complaint of Elevated LFTs (07 Dec 2023 06:07)      HPI: 57 y/o F with no known sig. PMHx presents to ED c/o painless jaundice. Patient reports around 2 weeks ago she felt she had a respiratory virus with associated epigastric discomfort, vomiting (x1) and diarrhea. She took activated charcoal in her tea for these symptoms. This lasted around 4 days and then self resolved. She then noticed her urine was dark and she had diffuse itching. Prior to the onset of these symptoms these symptoms she did consume an egg sandwich that she purchased from a gas station. She does not have a prior history of these symptoms or liver disease. She notes she was vaccinated for Hep A/B. She does forage for mushrooms (lion's frances and reishi), she also takes goldenrod plant. She notes recent travel to [place], no out of country travel. She denies ETOH use, IVDU. She is a former tobacco smoker. She has not had an EGD, her last colonoscopy was June 2022 which showed polyps per patient, she follows with Dr. Mendoza from [facility]. She does not have a family history of liver disease. She does not use OTC or prescription medications.       PAST MEDICAL & SURGICAL HISTORY:  No pertinent past medical history      No significant past surgical history          REVIEW OF SYSTEMS:   General: Negative  HEENT: Negative  CV: Negative  Respiratory: Negative  GI: See HPI  : Negative  MSK: Negative  Hematologic: Negative  Skin: Negative    MEDICATIONS:   MEDICATIONS  (STANDING):  acetylcysteine IVPB 10 Gram(s) IV Intermittent every 24 hours  acetylcysteine IVPB 10 Gram(s) IV Intermittent once  acetylcysteine IVPB 3.5 Gram(s) IV Intermittent once  influenza   Vaccine 0.5 milliLiter(s) IntraMuscular once    MEDICATIONS  (PRN):  acetaminophen     Tablet .. 650 milliGRAM(s) Oral every 6 hours PRN Temp greater or equal to 38C (100.4F), Mild Pain (1 - 3), Moderate Pain (4 - 6)  ondansetron Injectable 4 milliGRAM(s) IV Push every 6 hours PRN Nausea and/or Vomiting          DIET:  Diet, Regular (12-07-23 @ 08:31) [Active]          ALLERGIES:   Allergies    tetracycline (Hives)    Intolerances        Substance Use:   (  ) never used  (  ) other:  Tobacco Usage:  (   ) never smoked   (   ) former smoker   (   ) current smoker  (     ) pack year  (        ) last cigarette date  Alcohol Usage:    Family History   IBD (  ) Yes   (  ) No  GI Malignancy (  )  Yes    (  ) No    Health Management  Last Colonoscopy:  Last Endoscopy:     VITAL SIGNS:   Vital Signs Last 24 Hrs  T(C): 36.6 (07 Dec 2023 06:39), Max: 36.8 (07 Dec 2023 06:21)  T(F): 97.8 (07 Dec 2023 06:39), Max: 98.2 (07 Dec 2023 06:21)  HR: 72 (07 Dec 2023 06:39) (67 - 72)  BP: 117/71 (07 Dec 2023 06:39) (115/73 - 127/78)  BP(mean): --  RR: 18 (07 Dec 2023 06:39) (16 - 18)  SpO2: 96% (07 Dec 2023 06:39) (96% - 99%)    Parameters below as of 07 Dec 2023 06:39  Patient On (Oxygen Delivery Method): room air      I&O's Summary      PHYSICAL EXAM:   GENERAL:  No acute distress  HEENT:  NC/AT, conjunctiva clear, sclera icteric  CHEST:  No increased effort  HEART:  Regular rate  ABDOMEN:  Soft, non-tender, non-distended, normoactive bowel sounds, no rebound or guarding  EXTREMITIES: No edema  SKIN:  Warm, dry, jaundiced   NEURO:  Calm, cooperative, AOX4, no asterixis     LABS:                        13.8   3.86  )-----------( 292      ( 07 Dec 2023 08:44 )             39.6     Hemoglobin: 13.8 g/dL (12-07-23 @ 08:44)  Hemoglobin: 13.3 g/dL (12-06-23 @ 18:50)    12-07    138  |  101  |  8.2  ----------------------------<  89  4.2   |  22.0  |  0.61    Ca    9.4      07 Dec 2023 08:44    TPro  6.9  /  Alb  3.8  /  TBili  7.2<H>  /  DBili  5.9<H>  /  AST  157<H>  /  ALT  185<H>  /  AlkPhos  191<H>  12-07    LIVER FUNCTIONS - ( 07 Dec 2023 08:44 )  Alb: 3.8 g/dL / Pro: 6.9 g/dL / ALK PHOS: 191 U/L / ALT: 185 U/L / AST: 157 U/L / GGT: x             PT/INR - ( 06 Dec 2023 18:50 )   PT: 9.4 sec;   INR: 0.84 ratio         PTT - ( 07 Dec 2023 08:44 )  PTT:31.1 sec    Lipase: 28 U/L (12-06-23 @ 18:50)                                    RADIOLOGY & ADDITIONAL STUDIES:      ACC: [id number] EXAM:  CT ABDOMEN AND PELVIS IC   ORDERED BY: JOSÉ MASSEY     PROCEDURE DATE:  12/07/2023          INTERPRETATION:  CLINICAL INFORMATION: 58 years old. Female. jaundice.    COMPARISON: None.    CONTRAST/COMPLICATIONS:  IV Contrast: Omnipaque 350  90 cc administered  10 cc discarded.  Oral Contrast: NONE  Complications: None reported at time of study completion    PROCEDURE:  CT of the Abdomen and Pelvis was performed.  Sagittal and coronal reformats were performed.    FINDINGS:    LOWER CHEST: Within normal limits.    LIVER: Within normal limits.  BILE DUCTS: Normal caliber.  GALLBLADDER: Within normal limits.  SPLEEN: Within normal limits.  PANCREAS: Within normal limits.  ADRENALS: Within normal limits.  KIDNEYS/URETERS: Enhance symmetrically. No hydronephrosis. A   subcentimeter hypodensity in the left kidney is too small to characterize.    BLADDER: Within normal limits.  REPRODUCTIVE ORGANS: Uterus and adnexa are grossly unremarkable.    BOWEL: No bowel obstruction. Appendix is unremarkable.  PERITONEUM: No ascites.  VESSELS: Normal caliber abdominal aorta.  RETROPERITONEUM/LYMPH NODES: No lymphadenopathy.  ABDOMINAL WALL: Within normal limits.  BONES: Mild degenerative changes in the spine.    IMPRESSION:    No acute CT findings in the abdomen or pelvis. No CT findings to explain   jaundice.    --- End of Report ---            RONNY MIGUEL MD; Attending Radiologist  This document has been electronically signed. Dec  7 2023  1:45AM  12-07-23 @ 00:40           Chief Complaint:  Patient is a 58y old  Female who presents with a chief complaint of Elevated LFTs (07 Dec 2023 06:07)        Patient is a 58y old  Female who presents with a chief complaint of Elevated LFTs (07 Dec 2023 06:07)      HPI: 57 y/o F with no known sig. PMHx presents to ED c/o painless jaundice. Patient reports around 2 weeks ago she felt she had a respiratory virus with associated epigastric discomfort, vomiting (x1) and diarrhea. She took activated charcoal in her tea for these symptoms. This lasted around 4 days and then self resolved. She then noticed her urine was dark and she had diffuse itching. Prior to the onset of these symptoms these symptoms she did consume an egg sandwich that she purchased from a gas station. She does not have a prior history of these symptoms or liver disease. She notes she was vaccinated for Hep A/B. She does forage for mushrooms (lion's frances and reishi), she also takes goldenrod plant. She notes recent travel to [place], no out of country travel. She denies ETOH use, IVDU. She is a former tobacco smoker. She has not had an EGD, her last colonoscopy was June 2022 which showed polyps per patient, she follows with Dr. Mendoza from [facility]. She does not have a family history of liver disease. She does not use OTC or prescription medications.       PAST MEDICAL & SURGICAL HISTORY:  No pertinent past medical history      No significant past surgical history          REVIEW OF SYSTEMS:   General: Negative  HEENT: Negative  CV: Negative  Respiratory: Negative  GI: See HPI  : Negative  MSK: Negative  Hematologic: Negative  Skin: Negative    MEDICATIONS:   MEDICATIONS  (STANDING):  acetylcysteine IVPB 10 Gram(s) IV Intermittent every 24 hours  acetylcysteine IVPB 10 Gram(s) IV Intermittent once  acetylcysteine IVPB 3.5 Gram(s) IV Intermittent once  influenza   Vaccine 0.5 milliLiter(s) IntraMuscular once    MEDICATIONS  (PRN):  acetaminophen     Tablet .. 650 milliGRAM(s) Oral every 6 hours PRN Temp greater or equal to 38C (100.4F), Mild Pain (1 - 3), Moderate Pain (4 - 6)  ondansetron Injectable 4 milliGRAM(s) IV Push every 6 hours PRN Nausea and/or Vomiting          DIET:  Diet, Regular (12-07-23 @ 08:31) [Active]          ALLERGIES:   Allergies    tetracycline (Hives)    Intolerances        Substance Use:   (  ) never used  (  ) other:  Tobacco Usage:  (   ) never smoked   (   ) former smoker   (   ) current smoker  (     ) pack year  (        ) last cigarette date  Alcohol Usage:    Family History   IBD (  ) Yes   (  ) No  GI Malignancy (  )  Yes    (  ) No    Health Management  Last Colonoscopy:  Last Endoscopy:     VITAL SIGNS:   Vital Signs Last 24 Hrs  T(C): 36.6 (07 Dec 2023 06:39), Max: 36.8 (07 Dec 2023 06:21)  T(F): 97.8 (07 Dec 2023 06:39), Max: 98.2 (07 Dec 2023 06:21)  HR: 72 (07 Dec 2023 06:39) (67 - 72)  BP: 117/71 (07 Dec 2023 06:39) (115/73 - 127/78)  BP(mean): --  RR: 18 (07 Dec 2023 06:39) (16 - 18)  SpO2: 96% (07 Dec 2023 06:39) (96% - 99%)    Parameters below as of 07 Dec 2023 06:39  Patient On (Oxygen Delivery Method): room air      I&O's Summary      PHYSICAL EXAM:   GENERAL:  No acute distress  HEENT:  NC/AT, conjunctiva clear, sclera icteric  CHEST:  No increased effort  HEART:  Regular rate  ABDOMEN:  Soft, non-tender, non-distended, normoactive bowel sounds, no rebound or guarding  EXTREMITIES: No edema  SKIN:  Warm, dry, jaundiced   NEURO:  Calm, cooperative, AOX4, no asterixis     LABS:                        13.8   3.86  )-----------( 292      ( 07 Dec 2023 08:44 )             39.6     Hemoglobin: 13.8 g/dL (12-07-23 @ 08:44)  Hemoglobin: 13.3 g/dL (12-06-23 @ 18:50)    12-07    138  |  101  |  8.2  ----------------------------<  89  4.2   |  22.0  |  0.61    Ca    9.4      07 Dec 2023 08:44    TPro  6.9  /  Alb  3.8  /  TBili  7.2<H>  /  DBili  5.9<H>  /  AST  157<H>  /  ALT  185<H>  /  AlkPhos  191<H>  12-07    LIVER FUNCTIONS - ( 07 Dec 2023 08:44 )  Alb: 3.8 g/dL / Pro: 6.9 g/dL / ALK PHOS: 191 U/L / ALT: 185 U/L / AST: 157 U/L / GGT: x             PT/INR - ( 06 Dec 2023 18:50 )   PT: 9.4 sec;   INR: 0.84 ratio         PTT - ( 07 Dec 2023 08:44 )  PTT:31.1 sec    Lipase: 28 U/L (12-06-23 @ 18:50)                                    RADIOLOGY & ADDITIONAL STUDIES:      ACC: [id number] EXAM:  CT ABDOMEN AND PELVIS IC   ORDERED BY: JOSÉ MASSEY     PROCEDURE DATE:  12/07/2023          INTERPRETATION:  CLINICAL INFORMATION: 58 years old. Female. jaundice.    COMPARISON: None.    CONTRAST/COMPLICATIONS:  IV Contrast: Omnipaque 350  90 cc administered  10 cc discarded.  Oral Contrast: NONE  Complications: None reported at time of study completion    PROCEDURE:  CT of the Abdomen and Pelvis was performed.  Sagittal and coronal reformats were performed.    FINDINGS:    LOWER CHEST: Within normal limits.    LIVER: Within normal limits.  BILE DUCTS: Normal caliber.  GALLBLADDER: Within normal limits.  SPLEEN: Within normal limits.  PANCREAS: Within normal limits.  ADRENALS: Within normal limits.  KIDNEYS/URETERS: Enhance symmetrically. No hydronephrosis. A   subcentimeter hypodensity in the left kidney is too small to characterize.    BLADDER: Within normal limits.  REPRODUCTIVE ORGANS: Uterus and adnexa are grossly unremarkable.    BOWEL: No bowel obstruction. Appendix is unremarkable.  PERITONEUM: No ascites.  VESSELS: Normal caliber abdominal aorta.  RETROPERITONEUM/LYMPH NODES: No lymphadenopathy.  ABDOMINAL WALL: Within normal limits.  BONES: Mild degenerative changes in the spine.    IMPRESSION:    No acute CT findings in the abdomen or pelvis. No CT findings to explain   jaundice.    --- End of Report ---            RONNY MIGUEL MD; Attending Radiologist  This document has been electronically signed. Dec  7 2023  1:45AM  12-07-23 @ 00:40

## 2023-12-07 NOTE — CONSULT NOTE ADULT - ASSESSMENT
59 y/o F with no known sig. PMHx presents to ED c/o painless jaundice    #Jaundice  Recent viral illness  Herbal supplements (goldenrod), forages for mushrooms (lion's frances and reishi)  CT A/P 12/7/23- no acute findings- normal liver, normal caliber bile ducts    - NAC protocol   - Trend CBC, LFTs, coags daily  - Avoid hepatotoxins  - Avoid herbal supplements and mushrooms   - Labs pending: acute hepatitis, tick panel, RVP, Hep E, BREEZY, CMV, EBV, HSV, VZV  - Will discuss case with hepatologist Dr. Chacko  - No indication for MRCP at this time    _________________________________________________________________  Assessment and recommendations are final when note is signed by the attending physician.  57 y/o F with no known sig. PMHx presents to ED c/o painless jaundice    #Jaundice  Recent viral illness  Herbal supplements (goldenrod), forages for mushrooms (lion's frances and reishi)  CT A/P 12/7/23- no acute findings- normal liver, normal caliber bile ducts    - NAC protocol   - Trend CBC, LFTs, coags daily  - Avoid hepatotoxins  - Avoid herbal supplements and mushrooms   - Labs pending: acute hepatitis, tick panel, RVP, Hep E, BREEZY, CMV, EBV, HSV, VZV, ASMA, LKM1, soluble liver Ag, AMA, ceruloplasmin, iron, ferritin   - Discussed case with hepatologist Dr. Chacko  - No indication for MRCP at this time    _________________________________________________________________  Assessment and recommendations are final when note is signed by the attending physician.

## 2023-12-07 NOTE — H&P ADULT - NSHPLABSRESULTS_GEN_ALL_CORE
12-06    135  |  98  |  10.4  ----------------------------<  98  4.5   |  20.0<L>  |  0.44<L>    Ca    9.8      06 Dec 2023 18:50    TPro  7.2  /  Alb  4.1  /  TBili  7.2<H>  /  DBili  5.7<H>  /  AST  160<H>  /  ALT  191<H>  /  AlkPhos  193<H>  12-06                            13.3   3.95  )-----------( 267      ( 06 Dec 2023 18:50 )             38.2

## 2023-12-08 LAB
ALBUMIN SERPL ELPH-MCNC: 3.7 G/DL — SIGNIFICANT CHANGE UP (ref 3.3–5.2)
ALBUMIN SERPL ELPH-MCNC: 3.7 G/DL — SIGNIFICANT CHANGE UP (ref 3.3–5.2)
ALP SERPL-CCNC: 176 U/L — HIGH (ref 40–120)
ALP SERPL-CCNC: 176 U/L — HIGH (ref 40–120)
ALT FLD-CCNC: 164 U/L — HIGH
ALT FLD-CCNC: 164 U/L — HIGH
ANION GAP SERPL CALC-SCNC: 12 MMOL/L — SIGNIFICANT CHANGE UP (ref 5–17)
ANION GAP SERPL CALC-SCNC: 12 MMOL/L — SIGNIFICANT CHANGE UP (ref 5–17)
APTT BLD: 28.7 SEC — SIGNIFICANT CHANGE UP (ref 24.5–35.6)
APTT BLD: 28.7 SEC — SIGNIFICANT CHANGE UP (ref 24.5–35.6)
AST SERPL-CCNC: 113 U/L — HIGH
AST SERPL-CCNC: 113 U/L — HIGH
BILIRUB DIRECT SERPL-MCNC: 5.6 MG/DL — HIGH (ref 0–0.3)
BILIRUB DIRECT SERPL-MCNC: 5.6 MG/DL — HIGH (ref 0–0.3)
BILIRUB INDIRECT FLD-MCNC: 1.7 MG/DL — HIGH (ref 0.2–1)
BILIRUB INDIRECT FLD-MCNC: 1.7 MG/DL — HIGH (ref 0.2–1)
BILIRUB SERPL-MCNC: 7.3 MG/DL — HIGH (ref 0.4–2)
BILIRUB SERPL-MCNC: 7.3 MG/DL — HIGH (ref 0.4–2)
BUN SERPL-MCNC: 6.8 MG/DL — LOW (ref 8–20)
BUN SERPL-MCNC: 6.8 MG/DL — LOW (ref 8–20)
CALCIUM SERPL-MCNC: 9.6 MG/DL — SIGNIFICANT CHANGE UP (ref 8.4–10.5)
CALCIUM SERPL-MCNC: 9.6 MG/DL — SIGNIFICANT CHANGE UP (ref 8.4–10.5)
CERULOPLASMIN SERPL-MCNC: 30 MG/DL — SIGNIFICANT CHANGE UP (ref 16–45)
CERULOPLASMIN SERPL-MCNC: 30 MG/DL — SIGNIFICANT CHANGE UP (ref 16–45)
CHLORIDE SERPL-SCNC: 103 MMOL/L — SIGNIFICANT CHANGE UP (ref 96–108)
CHLORIDE SERPL-SCNC: 103 MMOL/L — SIGNIFICANT CHANGE UP (ref 96–108)
CMV IGG FLD QL: 1.2 U/ML — HIGH
CMV IGG FLD QL: 1.2 U/ML — HIGH
CMV IGG SERPL-IMP: POSITIVE
CMV IGG SERPL-IMP: POSITIVE
CMV IGM FLD-ACNC: 10.8 AU/ML — SIGNIFICANT CHANGE UP
CMV IGM FLD-ACNC: 10.8 AU/ML — SIGNIFICANT CHANGE UP
CMV IGM SERPL QL: NEGATIVE — SIGNIFICANT CHANGE UP
CMV IGM SERPL QL: NEGATIVE — SIGNIFICANT CHANGE UP
CO2 SERPL-SCNC: 25 MMOL/L — SIGNIFICANT CHANGE UP (ref 22–29)
CO2 SERPL-SCNC: 25 MMOL/L — SIGNIFICANT CHANGE UP (ref 22–29)
CREAT SERPL-MCNC: 0.62 MG/DL — SIGNIFICANT CHANGE UP (ref 0.5–1.3)
CREAT SERPL-MCNC: 0.62 MG/DL — SIGNIFICANT CHANGE UP (ref 0.5–1.3)
EBV EA AB SER IA-ACNC: 76.2 U/ML — HIGH
EBV EA AB SER IA-ACNC: 76.2 U/ML — HIGH
EBV EA AB TITR SER IF: POSITIVE
EBV EA AB TITR SER IF: POSITIVE
EBV EA IGG SER-ACNC: POSITIVE
EBV EA IGG SER-ACNC: POSITIVE
EBV NA IGG SER IA-ACNC: 43.3 U/ML — HIGH
EBV NA IGG SER IA-ACNC: 43.3 U/ML — HIGH
EBV PATRN SPEC IB-IMP: SIGNIFICANT CHANGE UP
EBV PATRN SPEC IB-IMP: SIGNIFICANT CHANGE UP
EBV VCA IGG AVIDITY SER QL IA: POSITIVE
EBV VCA IGG AVIDITY SER QL IA: POSITIVE
EBV VCA IGM SER IA-ACNC: 30.6 U/ML — SIGNIFICANT CHANGE UP
EBV VCA IGM SER IA-ACNC: 30.6 U/ML — SIGNIFICANT CHANGE UP
EBV VCA IGM SER IA-ACNC: 636 U/ML — HIGH
EBV VCA IGM SER IA-ACNC: 636 U/ML — HIGH
EBV VCA IGM TITR FLD: NEGATIVE — SIGNIFICANT CHANGE UP
EBV VCA IGM TITR FLD: NEGATIVE — SIGNIFICANT CHANGE UP
EGFR: 103 ML/MIN/1.73M2 — SIGNIFICANT CHANGE UP
EGFR: 103 ML/MIN/1.73M2 — SIGNIFICANT CHANGE UP
FERRITIN SERPL-MCNC: 147 NG/ML — SIGNIFICANT CHANGE UP (ref 13–330)
FERRITIN SERPL-MCNC: 147 NG/ML — SIGNIFICANT CHANGE UP (ref 13–330)
GLUCOSE SERPL-MCNC: 111 MG/DL — HIGH (ref 70–99)
GLUCOSE SERPL-MCNC: 111 MG/DL — HIGH (ref 70–99)
HAV IGM SER-ACNC: SIGNIFICANT CHANGE UP
HAV IGM SER-ACNC: SIGNIFICANT CHANGE UP
HBV CORE IGM SER-ACNC: SIGNIFICANT CHANGE UP
HBV CORE IGM SER-ACNC: SIGNIFICANT CHANGE UP
HBV SURFACE AG SER-ACNC: SIGNIFICANT CHANGE UP
HBV SURFACE AG SER-ACNC: SIGNIFICANT CHANGE UP
HCT VFR BLD CALC: 38.6 % — SIGNIFICANT CHANGE UP (ref 34.5–45)
HCT VFR BLD CALC: 38.6 % — SIGNIFICANT CHANGE UP (ref 34.5–45)
HCV AB S/CO SERPL IA: 0.07 S/CO — SIGNIFICANT CHANGE UP (ref 0–0.99)
HCV AB S/CO SERPL IA: 0.07 S/CO — SIGNIFICANT CHANGE UP (ref 0–0.99)
HCV AB S/CO SERPL IA: 0.08 S/CO — SIGNIFICANT CHANGE UP (ref 0–0.99)
HCV AB S/CO SERPL IA: 0.08 S/CO — SIGNIFICANT CHANGE UP (ref 0–0.99)
HCV AB SERPL-IMP: SIGNIFICANT CHANGE UP
HGB BLD-MCNC: 12.9 G/DL — SIGNIFICANT CHANGE UP (ref 11.5–15.5)
HGB BLD-MCNC: 12.9 G/DL — SIGNIFICANT CHANGE UP (ref 11.5–15.5)
HSV DNA1: SIGNIFICANT CHANGE UP
HSV DNA1: SIGNIFICANT CHANGE UP
HSV DNA2: SIGNIFICANT CHANGE UP
HSV DNA2: SIGNIFICANT CHANGE UP
HSV1 DNA BLD QL NAA+PROBE: SIGNIFICANT CHANGE UP
HSV1 DNA BLD QL NAA+PROBE: SIGNIFICANT CHANGE UP
HSV2 DNA BLD QL NAA+PROBE: SIGNIFICANT CHANGE UP
HSV2 DNA BLD QL NAA+PROBE: SIGNIFICANT CHANGE UP
INR BLD: 0.93 RATIO — SIGNIFICANT CHANGE UP (ref 0.85–1.18)
INR BLD: 0.93 RATIO — SIGNIFICANT CHANGE UP (ref 0.85–1.18)
IRON SATN MFR SERPL: 230 UG/DL — HIGH (ref 37–145)
IRON SATN MFR SERPL: 230 UG/DL — HIGH (ref 37–145)
MCHC RBC-ENTMCNC: 28.2 PG — SIGNIFICANT CHANGE UP (ref 27–34)
MCHC RBC-ENTMCNC: 28.2 PG — SIGNIFICANT CHANGE UP (ref 27–34)
MCHC RBC-ENTMCNC: 33.4 GM/DL — SIGNIFICANT CHANGE UP (ref 32–36)
MCHC RBC-ENTMCNC: 33.4 GM/DL — SIGNIFICANT CHANGE UP (ref 32–36)
MCV RBC AUTO: 84.5 FL — SIGNIFICANT CHANGE UP (ref 80–100)
MCV RBC AUTO: 84.5 FL — SIGNIFICANT CHANGE UP (ref 80–100)
PLATELET # BLD AUTO: 281 K/UL — SIGNIFICANT CHANGE UP (ref 150–400)
PLATELET # BLD AUTO: 281 K/UL — SIGNIFICANT CHANGE UP (ref 150–400)
POTASSIUM SERPL-MCNC: 3.9 MMOL/L — SIGNIFICANT CHANGE UP (ref 3.5–5.3)
POTASSIUM SERPL-MCNC: 3.9 MMOL/L — SIGNIFICANT CHANGE UP (ref 3.5–5.3)
POTASSIUM SERPL-SCNC: 3.9 MMOL/L — SIGNIFICANT CHANGE UP (ref 3.5–5.3)
POTASSIUM SERPL-SCNC: 3.9 MMOL/L — SIGNIFICANT CHANGE UP (ref 3.5–5.3)
PROT SERPL-MCNC: 6.3 G/DL — LOW (ref 6.6–8.7)
PROT SERPL-MCNC: 6.3 G/DL — LOW (ref 6.6–8.7)
PROTHROM AB SERPL-ACNC: 10.3 SEC — SIGNIFICANT CHANGE UP (ref 9.5–13)
PROTHROM AB SERPL-ACNC: 10.3 SEC — SIGNIFICANT CHANGE UP (ref 9.5–13)
RAPID RVP RESULT: SIGNIFICANT CHANGE UP
RAPID RVP RESULT: SIGNIFICANT CHANGE UP
RBC # BLD: 4.57 M/UL — SIGNIFICANT CHANGE UP (ref 3.8–5.2)
RBC # BLD: 4.57 M/UL — SIGNIFICANT CHANGE UP (ref 3.8–5.2)
RBC # FLD: 13.7 % — SIGNIFICANT CHANGE UP (ref 10.3–14.5)
RBC # FLD: 13.7 % — SIGNIFICANT CHANGE UP (ref 10.3–14.5)
SARS-COV-2 RNA SPEC QL NAA+PROBE: SIGNIFICANT CHANGE UP
SARS-COV-2 RNA SPEC QL NAA+PROBE: SIGNIFICANT CHANGE UP
SODIUM SERPL-SCNC: 140 MMOL/L — SIGNIFICANT CHANGE UP (ref 135–145)
SODIUM SERPL-SCNC: 140 MMOL/L — SIGNIFICANT CHANGE UP (ref 135–145)
VZV IGG FLD QL IA: 3262 INDEX — SIGNIFICANT CHANGE UP
VZV IGG FLD QL IA: 3262 INDEX — SIGNIFICANT CHANGE UP
VZV IGG FLD QL IA: POSITIVE — SIGNIFICANT CHANGE UP
VZV IGG FLD QL IA: POSITIVE — SIGNIFICANT CHANGE UP
WBC # BLD: 3.26 K/UL — LOW (ref 3.8–10.5)
WBC # BLD: 3.26 K/UL — LOW (ref 3.8–10.5)
WBC # FLD AUTO: 3.26 K/UL — LOW (ref 3.8–10.5)
WBC # FLD AUTO: 3.26 K/UL — LOW (ref 3.8–10.5)

## 2023-12-08 PROCEDURE — 99232 SBSQ HOSP IP/OBS MODERATE 35: CPT

## 2023-12-08 PROCEDURE — 99231 SBSQ HOSP IP/OBS SF/LOW 25: CPT

## 2023-12-08 RX ORDER — HYDROXYZINE HCL 10 MG
25 TABLET ORAL EVERY 6 HOURS
Refills: 0 | Status: DISCONTINUED | OUTPATIENT
Start: 2023-12-08 | End: 2023-12-09

## 2023-12-08 RX ADMIN — Medication 41.67 GRAM(S): at 20:38

## 2023-12-08 RX ADMIN — Medication 25 MILLIGRAM(S): at 22:55

## 2023-12-08 NOTE — PROGRESS NOTE ADULT - SUBJECTIVE AND OBJECTIVE BOX
Chief Complaint:  Patient is a 58y old  Female who presents with a chief complaint of Elevated LFTs (07 Dec 2023 10:08)      HPI/ 24 hr events: Patient seen and examined at bedside. Pt feeling well this morning. She had 1 episode of vomiting after breakfast yesterday, she was able to eat toast after that. + BM. NAC infusing. Denies abdominal pain. Vitals are overall stable. She is not confused. T bili remains elevated, rest of LFTs starting to downtrend.       REVIEW OF SYSTEMS:   General: Negative  HEENT: Negative  CV: Negative  Respiratory: Negative  GI: See HPI  : Negative  MSK: Negative  Hematologic: Negative  Skin: Negative    MEDICATIONS:   MEDICATIONS  (STANDING):  acetylcysteine IVPB 10 Gram(s) IV Intermittent every 24 hours  influenza   Vaccine 0.5 milliLiter(s) IntraMuscular once    MEDICATIONS  (PRN):  acetaminophen     Tablet .. 650 milliGRAM(s) Oral every 6 hours PRN Temp greater or equal to 38C (100.4F), Mild Pain (1 - 3), Moderate Pain (4 - 6)  hydrOXYzine hydrochloride 25 milliGRAM(s) Oral every 6 hours PRN Itching  ondansetron Injectable 4 milliGRAM(s) IV Push every 6 hours PRN Nausea and/or Vomiting            DIET:  Diet, Regular (12-07-23 @ 08:31) [Active]          ALLERGIES:   Allergies    tetracycline (Hives)    Intolerances        VITAL SIGNS:   Vital Signs Last 24 Hrs  T(C): 36.9 (08 Dec 2023 04:10), Max: 36.9 (07 Dec 2023 21:29)  T(F): 98.4 (08 Dec 2023 04:10), Max: 98.4 (07 Dec 2023 21:29)  HR: 60 (08 Dec 2023 04:10) (60 - 89)  BP: 135/89 (08 Dec 2023 04:10) (106/71 - 135/89)  BP(mean): --  RR: 17 (08 Dec 2023 04:10) (17 - 18)  SpO2: 93% (08 Dec 2023 04:10) (93% - 98%)    Parameters below as of 08 Dec 2023 04:10  Patient On (Oxygen Delivery Method): room air      I&O's Summary      PHYSICAL EXAM:   GENERAL:  No acute distress  HEENT:  NC/AT, conjunctiva clear, sclera icteric  CHEST:  No increased effort  HEART:  Regular rate  ABDOMEN:  Soft, non-tender, non-distended, normoactive bowel sounds, no rebound or guarding  EXTREMITIES: No edema  SKIN:  Warm, dry, jaundiced  NEURO:  Calm, cooperative    LABS:                        12.9   3.26  )-----------( 281      ( 08 Dec 2023 06:02 )             38.6     Hemoglobin: 12.9 g/dL (12-08-23 @ 06:02)  Hemoglobin: 13.8 g/dL (12-07-23 @ 08:44)  Hemoglobin: 13.3 g/dL (12-06-23 @ 18:50)    12-08    140  |  103  |  6.8<L>  ----------------------------<  111<H>  3.9   |  25.0  |  0.62    Ca    9.6      08 Dec 2023 06:02    TPro  6.3<L>  /  Alb  3.7  /  TBili  7.3<H>  /  DBili  5.6<H>  /  AST  113<H>  /  ALT  164<H>  /  AlkPhos  176<H>  12-08    LIVER FUNCTIONS - ( 08 Dec 2023 06:02 )  Alb: 3.7 g/dL / Pro: 6.3 g/dL / ALK PHOS: 176 U/L / ALT: 164 U/L / AST: 113 U/L / GGT: x             PT/INR - ( 08 Dec 2023 06:02 )   PT: 10.3 sec;   INR: 0.93 ratio         PTT - ( 08 Dec 2023 06:02 )  PTT:28.7 sec        Hepatitis C Virus S/CO Ratio: 0.08 S/CO (12-06-23 @ 18:50)  Hepatitis A IgM Antibody: Nonreact (12-06-23 @ 18:50)  Hepatitis B Core IgM Antibody: Nonreact (12-06-23 @ 18:50)  Hepatitis B Surface Antigen: Nonreact (12-06-23 @ 18:50)                        RADIOLOGY & ADDITIONAL STUDIES:      ACC: [id number] EXAM:  CT ABDOMEN AND PELVIS IC   ORDERED BY: JOSÉ MASSEY     PROCEDURE DATE:  12/07/2023          INTERPRETATION:  CLINICAL INFORMATION: 58 years old. Female. jaundice.    COMPARISON: None.    CONTRAST/COMPLICATIONS:  IV Contrast: Omnipaque 350  90 cc administered  10 cc discarded.  Oral Contrast: NONE  Complications: None reported at time of study completion    PROCEDURE:  CT of the Abdomen and Pelvis was performed.  Sagittal and coronal reformats were performed.    FINDINGS:    LOWER CHEST: Within normal limits.    LIVER: Within normal limits.  BILE DUCTS: Normal caliber.  GALLBLADDER: Within normal limits.  SPLEEN: Within normal limits.  PANCREAS: Within normal limits.  ADRENALS: Within normal limits.  KIDNEYS/URETERS: Enhance symmetrically. No hydronephrosis. A   subcentimeter hypodensity in the left kidney is too small to characterize.    BLADDER: Within normal limits.  REPRODUCTIVE ORGANS: Uterus and adnexa are grossly unremarkable.    BOWEL: No bowel obstruction. Appendix is unremarkable.  PERITONEUM: No ascites.  VESSELS: Normal caliber abdominal aorta.  RETROPERITONEUM/LYMPH NODES: No lymphadenopathy.  ABDOMINAL WALL: Within normal limits.  BONES: Mild degenerative changes in the spine.    IMPRESSION:    No acute CT findings in the abdomen or pelvis. No CT findings to explain   jaundice.    --- End of Report ---            RONNY MIGUEL MD; Attending Radiologist  This document has been electronically signed. Dec  7 2023  1:45AM  12-07-23 @ 00:40               Chief Complaint:  Patient is a 58y old  Female who presents with a chief complaint of Elevated LFTs (07 Dec 2023 10:08)      HPI/ 24 hr events: Patient seen and examined at bedside. Pt feeling well this morning. She had 1 episode of vomiting after breakfast yesterday, she was able to eat toast after that. + BM. NAC infusing. Denies abdominal pain. Vitals are overall stable. She is not confused. T bili remains elevated, rest of LFTs starting to downtrend.       REVIEW OF SYSTEMS:   General: Negative  HEENT: Negative  CV: Negative  Respiratory: Negative  GI: See HPI  : Negative  MSK: Negative  Hematologic: Negative  Skin: Negative    MEDICATIONS:   MEDICATIONS  (STANDING):  acetylcysteine IVPB 10 Gram(s) IV Intermittent every 24 hours  influenza   Vaccine 0.5 milliLiter(s) IntraMuscular once    MEDICATIONS  (PRN):  acetaminophen     Tablet .. 650 milliGRAM(s) Oral every 6 hours PRN Temp greater or equal to 38C (100.4F), Mild Pain (1 - 3), Moderate Pain (4 - 6)  hydrOXYzine hydrochloride 25 milliGRAM(s) Oral every 6 hours PRN Itching  ondansetron Injectable 4 milliGRAM(s) IV Push every 6 hours PRN Nausea and/or Vomiting            DIET:  Diet, Regular (12-07-23 @ 08:31) [Active]          ALLERGIES:   Allergies    tetracycline (Hives)    Intolerances        VITAL SIGNS:   Vital Signs Last 24 Hrs  T(C): 36.9 (08 Dec 2023 04:10), Max: 36.9 (07 Dec 2023 21:29)  T(F): 98.4 (08 Dec 2023 04:10), Max: 98.4 (07 Dec 2023 21:29)  HR: 60 (08 Dec 2023 04:10) (60 - 89)  BP: 135/89 (08 Dec 2023 04:10) (106/71 - 135/89)  BP(mean): --  RR: 17 (08 Dec 2023 04:10) (17 - 18)  SpO2: 93% (08 Dec 2023 04:10) (93% - 98%)    Parameters below as of 08 Dec 2023 04:10  Patient On (Oxygen Delivery Method): room air      I&O's Summary      PHYSICAL EXAM:   GENERAL:  No acute distress  HEENT:  NC/AT, conjunctiva clear, sclera icteric  CHEST:  No increased effort  HEART:  Regular rate  ABDOMEN:  Soft, non-tender, non-distended, normoactive bowel sounds, no rebound or guarding  EXTREMITIES: No edema  SKIN:  Warm, dry, jaundiced  NEURO:  Calm, cooperative    LABS:                        12.9   3.26  )-----------( 281      ( 08 Dec 2023 06:02 )             38.6     Hemoglobin: 12.9 g/dL (12-08-23 @ 06:02)  Hemoglobin: 13.8 g/dL (12-07-23 @ 08:44)  Hemoglobin: 13.3 g/dL (12-06-23 @ 18:50)    12-08    140  |  103  |  6.8<L>  ----------------------------<  111<H>  3.9   |  25.0  |  0.62    Ca    9.6      08 Dec 2023 06:02    TPro  6.3<L>  /  Alb  3.7  /  TBili  7.3<H>  /  DBili  5.6<H>  /  AST  113<H>  /  ALT  164<H>  /  AlkPhos  176<H>  12-08    LIVER FUNCTIONS - ( 08 Dec 2023 06:02 )  Alb: 3.7 g/dL / Pro: 6.3 g/dL / ALK PHOS: 176 U/L / ALT: 164 U/L / AST: 113 U/L / GGT: x             PT/INR - ( 08 Dec 2023 06:02 )   PT: 10.3 sec;   INR: 0.93 ratio         PTT - ( 08 Dec 2023 06:02 )  PTT:28.7 sec        Hepatitis C Virus S/CO Ratio: 0.08 S/CO (12-06-23 @ 18:50)  Hepatitis A IgM Antibody: Nonreact (12-06-23 @ 18:50)  Hepatitis B Core IgM Antibody: Nonreact (12-06-23 @ 18:50)  Hepatitis B Surface Antigen: Nonreact (12-06-23 @ 18:50)                        RADIOLOGY & ADDITIONAL STUDIES:      ACC: [id number] EXAM:  CT ABDOMEN AND PELVIS IC   ORDERED BY: JOSÉ MASSEY     PROCEDURE DATE:  12/07/2023          INTERPRETATION:  CLINICAL INFORMATION: 58 years old. Female. jaundice.    COMPARISON: None.    CONTRAST/COMPLICATIONS:  IV Contrast: Omnipaque 350  90 cc administered  10 cc discarded.  Oral Contrast: NONE  Complications: None reported at time of study completion    PROCEDURE:  CT of the Abdomen and Pelvis was performed.  Sagittal and coronal reformats were performed.    FINDINGS:    LOWER CHEST: Within normal limits.    LIVER: Within normal limits.  BILE DUCTS: Normal caliber.  GALLBLADDER: Within normal limits.  SPLEEN: Within normal limits.  PANCREAS: Within normal limits.  ADRENALS: Within normal limits.  KIDNEYS/URETERS: Enhance symmetrically. No hydronephrosis. A   subcentimeter hypodensity in the left kidney is too small to characterize.    BLADDER: Within normal limits.  REPRODUCTIVE ORGANS: Uterus and adnexa are grossly unremarkable.    BOWEL: No bowel obstruction. Appendix is unremarkable.  PERITONEUM: No ascites.  VESSELS: Normal caliber abdominal aorta.  RETROPERITONEUM/LYMPH NODES: No lymphadenopathy.  ABDOMINAL WALL: Within normal limits.  BONES: Mild degenerative changes in the spine.    IMPRESSION:    No acute CT findings in the abdomen or pelvis. No CT findings to explain   jaundice.    --- End of Report ---            RONNY MIGUEL MD; Attending Radiologist  This document has been electronically signed. Dec  7 2023  1:45AM  12-07-23 @ 00:40

## 2023-12-08 NOTE — PROGRESS NOTE ADULT - ASSESSMENT
59 y/o F with no known sig. PMHx presents to ED c/o painless jaundice    #Jaundice  Recent viral illness  Herbal supplements (goldenrod), forages for mushrooms (lion's frances and reishi)  CT A/P 12/7/23- no acute findings- normal liver, normal caliber bile ducts  Acute hepatitis panel negative    - Continue NAC protocol   - Trend CBC, LFTs, coags daily  - Avoid hepatotoxins  - Avoid herbal supplements and mushrooms   - Labs pending: acute hepatitis, tick panel, RVP, Hep E, BREEZY, CMV, EBV, HSV, VZV, ASMA, LKM1, soluble liver Ag, AMA, celiac cascade   - No indication for MRCP at this time    _________________________________________________________________  Assessment and recommendations are final when note is signed by the attending physician.  57 y/o F with no known sig. PMHx presents to ED c/o painless jaundice    #Jaundice  Recent viral illness  Herbal supplements (goldenrod), forages for mushrooms (lion's frances and reishi)  CT A/P 12/7/23- no acute findings- normal liver, normal caliber bile ducts  Acute hepatitis panel negative    - Continue NAC protocol   - Trend CBC, LFTs, coags daily  - Avoid hepatotoxins  - Avoid herbal supplements and mushrooms   - Labs pending: acute hepatitis, tick panel, RVP, Hep E, BREEZY, CMV, EBV, HSV, VZV, ASMA, LKM1, soluble liver Ag, AMA, celiac cascade   - No indication for MRCP at this time    _________________________________________________________________  Assessment and recommendations are final when note is signed by the attending physician.

## 2023-12-08 NOTE — PROGRESS NOTE ADULT - SUBJECTIVE AND OBJECTIVE BOX
[facility] Division of Hospital Medicine    Chief Complaint:  Elevated LFTs    SUBJECTIVE / OVERNIGHT EVENTS:  Pt examiend lying in bed   States she feels well  Patient denies chest pain, SOB, abd pain, N/V, fever, chills, dysuria or any other complaints. All remainder ROS negative.     MEDICATIONS  (STANDING):  acetylcysteine IVPB 10 Gram(s) IV Intermittent every 24 hours  influenza   Vaccine 0.5 milliLiter(s) IntraMuscular once    MEDICATIONS  (PRN):  acetaminophen     Tablet .. 650 milliGRAM(s) Oral every 6 hours PRN Temp greater or equal to 38C (100.4F), Mild Pain (1 - 3), Moderate Pain (4 - 6)  hydrOXYzine hydrochloride 25 milliGRAM(s) Oral every 6 hours PRN Itching  ondansetron Injectable 4 milliGRAM(s) IV Push every 6 hours PRN Nausea and/or Vomiting        I&O's Summary      PHYSICAL EXAM:  Vital Signs Last 24 Hrs  T(C): 36.6 (08 Dec 2023 15:40), Max: 36.9 (07 Dec 2023 21:29)  T(F): 97.9 (08 Dec 2023 15:40), Max: 98.4 (07 Dec 2023 21:29)  HR: 60 (08 Dec 2023 15:40) (60 - 84)  BP: 99/65 (08 Dec 2023 15:40) (99/65 - 135/89)  BP(mean): --  RR: 18 (08 Dec 2023 15:40) (17 - 20)  SpO2: 97% (08 Dec 2023 15:40) (93% - 97%)    Parameters below as of 08 Dec 2023 15:40  Patient On (Oxygen Delivery Method): room air            CONSTITUTIONAL: Non toxic appearing, icteric female lying in bed  ENMT: Moist oral mucosa, no pharyngeal injection or exudates; normal dentition  RESPIRATORY: Normal respiratory effort; lungs are clear to auscultation bilaterally  CARDIOVASCULAR: Regular rate and rhythm, normal S1 and S2, no murmur/rub/gallop; No lower extremity edema; Peripheral pulses are 2+ bilaterally  ABDOMEN: Nontender to palpation, normoactive bowel sounds, no rebound/guarding; No hepatosplenomegaly  MUSCLOSKELETAL:  Normal gait; no clubbing or cyanosis of digits; no joint swelling or tenderness to palpation  PSYCH: A+O to person, place, and time; affect appropriate  NEUROLOGY: CN 2-12 are intact and symmetric; no gross sensory deficits;   SKIN: No rashes; no palpable lesions, scant bruising over left upper back     LABS:                        12.9   3.26  )-----------( 281      ( 08 Dec 2023 06:02 )             38.6     12-08    140  |  103  |  6.8<L>  ----------------------------<  111<H>  3.9   |  25.0  |  0.62    Ca    9.6      08 Dec 2023 06:02    TPro  6.3<L>  /  Alb  3.7  /  TBili  7.3<H>  /  DBili  5.6<H>  /  AST  113<H>  /  ALT  164<H>  /  AlkPhos  176<H>  12-08    PT/INR - ( 08 Dec 2023 06:02 )   PT: 10.3 sec;   INR: 0.93 ratio         PTT - ( 08 Dec 2023 06:02 )  PTT:28.7 sec      Urinalysis Basic - ( 08 Dec 2023 06:02 )    Color: x / Appearance: x / SG: x / pH: x  Gluc: 111 mg/dL / Ketone: x  / Bili: x / Urobili: x   Blood: x / Protein: x / Nitrite: x   Leuk Esterase: x / RBC: x / WBC x   Sq Epi: x / Non Sq Epi: x / Bacteria: x        CAPILLARY BLOOD GLUCOSE            RADIOLOGY & ADDITIONAL TESTS:  Results Reviewed:   Imaging Personally Reviewed:  Electrocardiogram Personally Reviewed:                                           [facility] Division of Hospital Medicine    Chief Complaint:  Elevated LFTs    SUBJECTIVE / OVERNIGHT EVENTS:  Pt examiend lying in bed   States she feels well  Patient denies chest pain, SOB, abd pain, N/V, fever, chills, dysuria or any other complaints. All remainder ROS negative.     MEDICATIONS  (STANDING):  acetylcysteine IVPB 10 Gram(s) IV Intermittent every 24 hours  influenza   Vaccine 0.5 milliLiter(s) IntraMuscular once    MEDICATIONS  (PRN):  acetaminophen     Tablet .. 650 milliGRAM(s) Oral every 6 hours PRN Temp greater or equal to 38C (100.4F), Mild Pain (1 - 3), Moderate Pain (4 - 6)  hydrOXYzine hydrochloride 25 milliGRAM(s) Oral every 6 hours PRN Itching  ondansetron Injectable 4 milliGRAM(s) IV Push every 6 hours PRN Nausea and/or Vomiting        I&O's Summary      PHYSICAL EXAM:  Vital Signs Last 24 Hrs  T(C): 36.6 (08 Dec 2023 15:40), Max: 36.9 (07 Dec 2023 21:29)  T(F): 97.9 (08 Dec 2023 15:40), Max: 98.4 (07 Dec 2023 21:29)  HR: 60 (08 Dec 2023 15:40) (60 - 84)  BP: 99/65 (08 Dec 2023 15:40) (99/65 - 135/89)  BP(mean): --  RR: 18 (08 Dec 2023 15:40) (17 - 20)  SpO2: 97% (08 Dec 2023 15:40) (93% - 97%)    Parameters below as of 08 Dec 2023 15:40  Patient On (Oxygen Delivery Method): room air            CONSTITUTIONAL: Non toxic appearing, icteric female lying in bed  ENMT: Moist oral mucosa, no pharyngeal injection or exudates; normal dentition  RESPIRATORY: Normal respiratory effort; lungs are clear to auscultation bilaterally  CARDIOVASCULAR: Regular rate and rhythm, normal S1 and S2, no murmur/rub/gallop; No lower extremity edema; Peripheral pulses are 2+ bilaterally  ABDOMEN: Nontender to palpation, normoactive bowel sounds, no rebound/guarding; No hepatosplenomegaly  MUSCLOSKELETAL:  Normal gait; no clubbing or cyanosis of digits; no joint swelling or tenderness to palpation  PSYCH: A+O to person, place, and time; affect appropriate  NEUROLOGY: CN 2-12 are intact and symmetric; no gross sensory deficits;   SKIN: No rashes; no palpable lesions, scant bruising over left upper back     LABS:                        12.9   3.26  )-----------( 281      ( 08 Dec 2023 06:02 )             38.6     12-08    140  |  103  |  6.8<L>  ----------------------------<  111<H>  3.9   |  25.0  |  0.62    Ca    9.6      08 Dec 2023 06:02    TPro  6.3<L>  /  Alb  3.7  /  TBili  7.3<H>  /  DBili  5.6<H>  /  AST  113<H>  /  ALT  164<H>  /  AlkPhos  176<H>  12-08    PT/INR - ( 08 Dec 2023 06:02 )   PT: 10.3 sec;   INR: 0.93 ratio         PTT - ( 08 Dec 2023 06:02 )  PTT:28.7 sec      Urinalysis Basic - ( 08 Dec 2023 06:02 )    Color: x / Appearance: x / SG: x / pH: x  Gluc: 111 mg/dL / Ketone: x  / Bili: x / Urobili: x   Blood: x / Protein: x / Nitrite: x   Leuk Esterase: x / RBC: x / WBC x   Sq Epi: x / Non Sq Epi: x / Bacteria: x        CAPILLARY BLOOD GLUCOSE            RADIOLOGY & ADDITIONAL TESTS:  Results Reviewed:   Imaging Personally Reviewed:  Electrocardiogram Personally Reviewed:

## 2023-12-08 NOTE — PROGRESS NOTE ADULT - ASSESSMENT
58yoF no PMH admited with painless jaundice with elevated LFTs of unclear etiology     Hyperbilirubinemia/elevated LFTs  Signifcant herbal tinture ingestion hitsory  Continue NAC protocol   Appreciate GI f/u. IF LFTs continue to improve, once complete NAC protoocl may be able to be dsc home   f/u on pending viral/hepatitis seologies      Prophylactic measure  No pharmacologic AC as low risk in event pt undergoes ERCP this admission  Intermittent pneumatic compressions

## 2023-12-09 ENCOUNTER — TRANSCRIPTION ENCOUNTER (OUTPATIENT)
Age: 58
End: 2023-12-09

## 2023-12-09 VITALS
RESPIRATION RATE: 18 BRPM | SYSTOLIC BLOOD PRESSURE: 120 MMHG | OXYGEN SATURATION: 98 % | DIASTOLIC BLOOD PRESSURE: 77 MMHG | HEART RATE: 58 BPM | TEMPERATURE: 98 F

## 2023-12-09 LAB
A PHAGOCYTOPH IGG TITR SER IF: SIGNIFICANT CHANGE UP TITER
A PHAGOCYTOPH IGG TITR SER IF: SIGNIFICANT CHANGE UP TITER
ALBUMIN SERPL ELPH-MCNC: 3.8 G/DL — SIGNIFICANT CHANGE UP (ref 3.3–5.2)
ALBUMIN SERPL ELPH-MCNC: 3.8 G/DL — SIGNIFICANT CHANGE UP (ref 3.3–5.2)
ALP SERPL-CCNC: 198 U/L — HIGH (ref 40–120)
ALP SERPL-CCNC: 198 U/L — HIGH (ref 40–120)
ALT FLD-CCNC: 163 U/L — HIGH
ALT FLD-CCNC: 163 U/L — HIGH
ANION GAP SERPL CALC-SCNC: 13 MMOL/L — SIGNIFICANT CHANGE UP (ref 5–17)
ANION GAP SERPL CALC-SCNC: 13 MMOL/L — SIGNIFICANT CHANGE UP (ref 5–17)
AST SERPL-CCNC: 110 U/L — HIGH
AST SERPL-CCNC: 110 U/L — HIGH
B BURGDOR AB SER QL IA: NEGATIVE — SIGNIFICANT CHANGE UP
B BURGDOR AB SER QL IA: NEGATIVE — SIGNIFICANT CHANGE UP
B MICROTI IGG TITR SER: SIGNIFICANT CHANGE UP TITER
B MICROTI IGG TITR SER: SIGNIFICANT CHANGE UP TITER
BILIRUB SERPL-MCNC: 7 MG/DL — HIGH (ref 0.4–2)
BILIRUB SERPL-MCNC: 7 MG/DL — HIGH (ref 0.4–2)
BUN SERPL-MCNC: 5.6 MG/DL — LOW (ref 8–20)
BUN SERPL-MCNC: 5.6 MG/DL — LOW (ref 8–20)
CALCIUM SERPL-MCNC: 10.1 MG/DL — SIGNIFICANT CHANGE UP (ref 8.4–10.5)
CALCIUM SERPL-MCNC: 10.1 MG/DL — SIGNIFICANT CHANGE UP (ref 8.4–10.5)
CHLORIDE SERPL-SCNC: 101 MMOL/L — SIGNIFICANT CHANGE UP (ref 96–108)
CHLORIDE SERPL-SCNC: 101 MMOL/L — SIGNIFICANT CHANGE UP (ref 96–108)
CMV DNA CSF QL NAA+PROBE: SIGNIFICANT CHANGE UP IU/ML
CMV DNA CSF QL NAA+PROBE: SIGNIFICANT CHANGE UP IU/ML
CMV DNA SPEC NAA+PROBE-LOG#: SIGNIFICANT CHANGE UP LOG10IU/ML
CMV DNA SPEC NAA+PROBE-LOG#: SIGNIFICANT CHANGE UP LOG10IU/ML
CO2 SERPL-SCNC: 24 MMOL/L — SIGNIFICANT CHANGE UP (ref 22–29)
CO2 SERPL-SCNC: 24 MMOL/L — SIGNIFICANT CHANGE UP (ref 22–29)
CREAT SERPL-MCNC: 0.55 MG/DL — SIGNIFICANT CHANGE UP (ref 0.5–1.3)
CREAT SERPL-MCNC: 0.55 MG/DL — SIGNIFICANT CHANGE UP (ref 0.5–1.3)
E CHAFFEENSIS IGG TITR SER IF: SIGNIFICANT CHANGE UP TITER
E CHAFFEENSIS IGG TITR SER IF: SIGNIFICANT CHANGE UP TITER
EGFR: 106 ML/MIN/1.73M2 — SIGNIFICANT CHANGE UP
EGFR: 106 ML/MIN/1.73M2 — SIGNIFICANT CHANGE UP
GLUCOSE SERPL-MCNC: 91 MG/DL — SIGNIFICANT CHANGE UP (ref 70–99)
GLUCOSE SERPL-MCNC: 91 MG/DL — SIGNIFICANT CHANGE UP (ref 70–99)
HCT VFR BLD CALC: 40.8 % — SIGNIFICANT CHANGE UP (ref 34.5–45)
HCT VFR BLD CALC: 40.8 % — SIGNIFICANT CHANGE UP (ref 34.5–45)
HGB BLD-MCNC: 13.6 G/DL — SIGNIFICANT CHANGE UP (ref 11.5–15.5)
HGB BLD-MCNC: 13.6 G/DL — SIGNIFICANT CHANGE UP (ref 11.5–15.5)
INR BLD: 0.94 RATIO — SIGNIFICANT CHANGE UP (ref 0.85–1.18)
INR BLD: 0.94 RATIO — SIGNIFICANT CHANGE UP (ref 0.85–1.18)
LKM AB SER-ACNC: <20.1 UNITS — SIGNIFICANT CHANGE UP (ref 0–20)
LKM AB SER-ACNC: <20.1 UNITS — SIGNIFICANT CHANGE UP (ref 0–20)
MAGNESIUM SERPL-MCNC: 2.2 MG/DL — SIGNIFICANT CHANGE UP (ref 1.6–2.6)
MAGNESIUM SERPL-MCNC: 2.2 MG/DL — SIGNIFICANT CHANGE UP (ref 1.6–2.6)
MCHC RBC-ENTMCNC: 28.8 PG — SIGNIFICANT CHANGE UP (ref 27–34)
MCHC RBC-ENTMCNC: 28.8 PG — SIGNIFICANT CHANGE UP (ref 27–34)
MCHC RBC-ENTMCNC: 33.3 GM/DL — SIGNIFICANT CHANGE UP (ref 32–36)
MCHC RBC-ENTMCNC: 33.3 GM/DL — SIGNIFICANT CHANGE UP (ref 32–36)
MCV RBC AUTO: 86.3 FL — SIGNIFICANT CHANGE UP (ref 80–100)
MCV RBC AUTO: 86.3 FL — SIGNIFICANT CHANGE UP (ref 80–100)
PLATELET # BLD AUTO: 269 K/UL — SIGNIFICANT CHANGE UP (ref 150–400)
PLATELET # BLD AUTO: 269 K/UL — SIGNIFICANT CHANGE UP (ref 150–400)
POTASSIUM SERPL-MCNC: 4.4 MMOL/L — SIGNIFICANT CHANGE UP (ref 3.5–5.3)
POTASSIUM SERPL-MCNC: 4.4 MMOL/L — SIGNIFICANT CHANGE UP (ref 3.5–5.3)
POTASSIUM SERPL-SCNC: 4.4 MMOL/L — SIGNIFICANT CHANGE UP (ref 3.5–5.3)
POTASSIUM SERPL-SCNC: 4.4 MMOL/L — SIGNIFICANT CHANGE UP (ref 3.5–5.3)
PROT SERPL-MCNC: 6.7 G/DL — SIGNIFICANT CHANGE UP (ref 6.6–8.7)
PROT SERPL-MCNC: 6.7 G/DL — SIGNIFICANT CHANGE UP (ref 6.6–8.7)
PROTHROM AB SERPL-ACNC: 10.4 SEC — SIGNIFICANT CHANGE UP (ref 9.5–13)
PROTHROM AB SERPL-ACNC: 10.4 SEC — SIGNIFICANT CHANGE UP (ref 9.5–13)
RBC # BLD: 4.73 M/UL — SIGNIFICANT CHANGE UP (ref 3.8–5.2)
RBC # BLD: 4.73 M/UL — SIGNIFICANT CHANGE UP (ref 3.8–5.2)
RBC # FLD: 13.7 % — SIGNIFICANT CHANGE UP (ref 10.3–14.5)
RBC # FLD: 13.7 % — SIGNIFICANT CHANGE UP (ref 10.3–14.5)
SODIUM SERPL-SCNC: 138 MMOL/L — SIGNIFICANT CHANGE UP (ref 135–145)
SODIUM SERPL-SCNC: 138 MMOL/L — SIGNIFICANT CHANGE UP (ref 135–145)
VZV IGM SER-ACNC: <0.91 INDEX — SIGNIFICANT CHANGE UP (ref 0–0.9)
VZV IGM SER-ACNC: <0.91 INDEX — SIGNIFICANT CHANGE UP (ref 0–0.9)
WBC # BLD: 3.24 K/UL — LOW (ref 3.8–10.5)
WBC # BLD: 3.24 K/UL — LOW (ref 3.8–10.5)
WBC # FLD AUTO: 3.24 K/UL — LOW (ref 3.8–10.5)
WBC # FLD AUTO: 3.24 K/UL — LOW (ref 3.8–10.5)

## 2023-12-09 PROCEDURE — 86787 VARICELLA-ZOSTER ANTIBODY: CPT

## 2023-12-09 PROCEDURE — 96376 TX/PRO/DX INJ SAME DRUG ADON: CPT

## 2023-12-09 PROCEDURE — 87799 DETECT AGENT NOS DNA QUANT: CPT

## 2023-12-09 PROCEDURE — 36415 COLL VENOUS BLD VENIPUNCTURE: CPT

## 2023-12-09 PROCEDURE — 86703 HIV-1/HIV-2 1 RESULT ANTBDY: CPT

## 2023-12-09 PROCEDURE — 0225U NFCT DS DNA&RNA 21 SARSCOV2: CPT

## 2023-12-09 PROCEDURE — 86364 TISS TRNSGLTMNASE EA IG CLAS: CPT

## 2023-12-09 PROCEDURE — 85027 COMPLETE CBC AUTOMATED: CPT

## 2023-12-09 PROCEDURE — 86038 ANTINUCLEAR ANTIBODIES: CPT

## 2023-12-09 PROCEDURE — 81001 URINALYSIS AUTO W/SCOPE: CPT

## 2023-12-09 PROCEDURE — 86255 FLUORESCENT ANTIBODY SCREEN: CPT

## 2023-12-09 PROCEDURE — 87529 HSV DNA AMP PROBE: CPT

## 2023-12-09 PROCEDURE — 81376 HLA II TYPING 1 LOCUS LR: CPT

## 2023-12-09 PROCEDURE — 85610 PROTHROMBIN TIME: CPT

## 2023-12-09 PROCEDURE — 86618 LYME DISEASE ANTIBODY: CPT

## 2023-12-09 PROCEDURE — 99231 SBSQ HOSP IP/OBS SF/LOW 25: CPT

## 2023-12-09 PROCEDURE — 86663 EPSTEIN-BARR ANTIBODY: CPT

## 2023-12-09 PROCEDURE — 86900 BLOOD TYPING SEROLOGIC ABO: CPT

## 2023-12-09 PROCEDURE — 96374 THER/PROPH/DIAG INJ IV PUSH: CPT

## 2023-12-09 PROCEDURE — 86753 PROTOZOA ANTIBODY NOS: CPT

## 2023-12-09 PROCEDURE — 80307 DRUG TEST PRSMV CHEM ANLYZR: CPT

## 2023-12-09 PROCEDURE — 80053 COMPREHEN METABOLIC PANEL: CPT

## 2023-12-09 PROCEDURE — 85730 THROMBOPLASTIN TIME PARTIAL: CPT

## 2023-12-09 PROCEDURE — 99285 EMERGENCY DEPT VISIT HI MDM: CPT

## 2023-12-09 PROCEDURE — 82248 BILIRUBIN DIRECT: CPT

## 2023-12-09 PROCEDURE — 86644 CMV ANTIBODY: CPT

## 2023-12-09 PROCEDURE — 87798 DETECT AGENT NOS DNA AMP: CPT

## 2023-12-09 PROCEDURE — 82728 ASSAY OF FERRITIN: CPT

## 2023-12-09 PROCEDURE — 74177 CT ABD & PELVIS W/CONTRAST: CPT | Mod: MA

## 2023-12-09 PROCEDURE — 86376 MICROSOMAL ANTIBODY EACH: CPT

## 2023-12-09 PROCEDURE — 83540 ASSAY OF IRON: CPT

## 2023-12-09 PROCEDURE — 86664 EPSTEIN-BARR NUCLEAR ANTIGEN: CPT

## 2023-12-09 PROCEDURE — 83690 ASSAY OF LIPASE: CPT

## 2023-12-09 PROCEDURE — 80074 ACUTE HEPATITIS PANEL: CPT

## 2023-12-09 PROCEDURE — 82390 ASSAY OF CERULOPLASMIN: CPT

## 2023-12-09 PROCEDURE — 83735 ASSAY OF MAGNESIUM: CPT

## 2023-12-09 PROCEDURE — 85025 COMPLETE CBC W/AUTO DIFF WBC: CPT

## 2023-12-09 PROCEDURE — 86803 HEPATITIS C AB TEST: CPT

## 2023-12-09 PROCEDURE — 86665 EPSTEIN-BARR CAPSID VCA: CPT

## 2023-12-09 PROCEDURE — 86790 VIRUS ANTIBODY NOS: CPT

## 2023-12-09 PROCEDURE — 86645 CMV ANTIBODY IGM: CPT

## 2023-12-09 PROCEDURE — 82784 ASSAY IGA/IGD/IGG/IGM EACH: CPT

## 2023-12-09 PROCEDURE — 99239 HOSP IP/OBS DSCHRG MGMT >30: CPT

## 2023-12-09 PROCEDURE — 96375 TX/PRO/DX INJ NEW DRUG ADDON: CPT

## 2023-12-09 PROCEDURE — 86850 RBC ANTIBODY SCREEN: CPT

## 2023-12-09 PROCEDURE — 86666 EHRLICHIA ANTIBODY: CPT

## 2023-12-09 PROCEDURE — 83520 IMMUNOASSAY QUANT NOS NONAB: CPT

## 2023-12-09 PROCEDURE — 86901 BLOOD TYPING SEROLOGIC RH(D): CPT

## 2023-12-09 PROCEDURE — 86381 MITOCHONDRIAL ANTIBODY EACH: CPT

## 2023-12-09 RX ORDER — HYDROXYZINE HCL 10 MG
1 TABLET ORAL
Qty: 21 | Refills: 0
Start: 2023-12-09 | End: 2023-12-15

## 2023-12-09 NOTE — DISCHARGE NOTE PROVIDER - ATTENDING DISCHARGE PHYSICAL EXAMINATION:
Gen : Non toxic appearing, comfortable, NAD, icteric   HEENT : NCAT, MMM, No cervical lymphadenopathy, no JVD  CVS : S1S2, regular rate and rhythm, no murmurs  Resp : CTA b/l, no rhonchi or wheezes appreciated   Abd : Soft, non distended, non tender, BS +ve   MSK : No joint swelling or tenderness, no digital clubbing, no distal cyanosis  Neuro : CN II-XII intact, no focal motor or sensory deficits   Psych : Pleasant, no anxiety, normal affect

## 2023-12-09 NOTE — DISCHARGE NOTE PROVIDER - HOSPITAL COURSE
57 y/o F with no known PMH admitted with painless jaundice. No definitive etiology for hepatitis identified thus far. Has been treated empirically with NAC and given stable LFTs is planned for dsc home wiuth outpt f/u with PCP and GI

## 2023-12-09 NOTE — DISCHARGE NOTE PROVIDER - NSDCCPCAREPLAN_GEN_ALL_CORE_FT
PRINCIPAL DISCHARGE DIAGNOSIS  Diagnosis: Jaundice  Assessment and Plan of Treatment: Follow up with your PCP in 1 week and have repeat blood work (including a chemistry panel with liver function tests) done at julio time.   Additionally ensure that you see your gastroenterologist within 2 weeks

## 2023-12-09 NOTE — PROGRESS NOTE ADULT - SUBJECTIVE AND OBJECTIVE BOX
Patient is a 58y old  Female who presents with a chief complaint of Elevated LFTs (08 Dec 2023 20:09)      HPI:  58yoF who was seen and examined at bedside, sitting in chair comfortably. No overnight events. No acute distress. LFTs stable, INR normal.       REVIEW OF SYSTEMS:  Constitutional: No fever, weight loss or fatigue  ENMT:  No difficulty hearing, tinnitus, vertigo; No sinus or throat pain  Respiratory: No cough, wheezing, chills or hemoptysis  Cardiovascular: No chest pain, palpitations, dizziness or leg swelling  Gastrointestinal: No abdominal or epigastric pain. No nausea, vomiting or hematemesis; No diarrhea or constipation. No melena or hematochezia.  Skin: No itching, burning, rashes or lesions   Musculoskeletal: No joint pain or swelling; No muscle, back or extremity pain    PAST MEDICAL & SURGICAL HISTORY:  No pertinent past medical history      No significant past surgical history          FAMILY HISTORY:  No pertinent family history in first degree relatives        SOCIAL HISTORY:  Smoking Status: [ ] Current, [ ] Former, [ ] Never  Pack Years:  [  ] EtOH  [  ] IVDA    MEDICATIONS:  MEDICATIONS  (STANDING):  acetylcysteine IVPB 10 Gram(s) IV Intermittent every 24 hours  influenza   Vaccine 0.5 milliLiter(s) IntraMuscular once    MEDICATIONS  (PRN):  acetaminophen     Tablet .. 650 milliGRAM(s) Oral every 6 hours PRN Temp greater or equal to 38C (100.4F), Mild Pain (1 - 3), Moderate Pain (4 - 6)  hydrOXYzine hydrochloride 25 milliGRAM(s) Oral every 6 hours PRN Itching  ondansetron Injectable 4 milliGRAM(s) IV Push every 6 hours PRN Nausea and/or Vomiting      Allergies    tetracycline (Hives)    Intolerances        Vital Signs Last 24 Hrs  T(C): 36.9 (09 Dec 2023 08:00), Max: 36.9 (09 Dec 2023 08:00)  T(F): 98.5 (09 Dec 2023 08:00), Max: 98.5 (09 Dec 2023 08:00)  HR: 66 (09 Dec 2023 08:00) (56 - 68)  BP: 98/62 (09 Dec 2023 08:00) (98/62 - 103/65)  BP(mean): --  RR: 18 (09 Dec 2023 08:00) (18 - 18)  SpO2: 96% (09 Dec 2023 08:00) (95% - 97%)    Parameters below as of 09 Dec 2023 08:00  Patient On (Oxygen Delivery Method): room air        12-08 @ 07:01  -  12-09 @ 07:00  --------------------------------------------------------  IN: 0 mL / OUT: 800 mL / NET: -800 mL          PHYSICAL EXAM:    General:  in no acute distress  HEENT: MMM, conjunctiva and sclera clear  Gastrointestinal: Soft, non-tender non-distended; Normal bowel sounds; No rebound or guarding  Extremities: Normal range of motion, No clubbing, cyanosis or edema  Neurological: Alert and oriented x3  Skin: Warm and dry. No obvious rash      LABS:                        13.6   3.24  )-----------( 269      ( 09 Dec 2023 07:28 )             40.8     09 Dec 2023 07:28    138    |  101    |  5.6    ----------------------------<  91     4.4     |  24.0   |  0.55     Ca    10.1       09 Dec 2023 07:28  Mg     2.2       09 Dec 2023 07:28    TPro  6.7    /  Alb  3.8    /  TBili  7.0    /  DBili  x      /  AST  110    /  ALT  163    /  AlkPhos  198    / Amylase x      /Lipase x      09 Dec 2023 07:28      Cytomegalovirus By PCR: NotDetec IU/mL (12.08.23 @ 06:02)   Varicella IgG Antibody Result: 3262.0 Index (12.08.23 @ 00:57)   Ceruloplasmin, Serum: 30 mg/dL (12.08.23 @ 00:57)   Babs-Barr Virus Capsid Antigen IgG: Positive: Babs-Barr Virus VCA IgG Antibody   Babs-Barr Nuclear Antigen: Positive: Babs-Barr Virus NA IgG Antibody   Babs Barr Virus IgM Antibody: Negative: Babs-Barr Virus VCA IgM Antibody   Babs-Barr Virus Early Antigen: Positive: Babs-Barr Virus EA IgG Antibody   EBV VCA IgG EIA: 636.0 U/mL (12.08.23 @ 00:57)   EBNA IgG EIA: 43.3 U/mL (12.08.23 @ 00:57)   EBV VCA IgM EIA: 30.6 U/mL (12.08.23 @ 00:57)   EBV EA Ab EIA: 76.2 U/mL (12.08.23 @ 00:57)         RADIOLOGY & ADDITIONAL STUDIES:    Patient is a 58y old  Female who presents with a chief complaint of Elevated LFTs (08 Dec 2023 20:09)      HPI:  58yoF who was seen and examined at bedside, sitting in chair comfortably. No overnight events. No acute distress. LFTs stable, INR normal.       REVIEW OF SYSTEMS:  Constitutional: No fever, weight loss or fatigue  ENMT:  No difficulty hearing, tinnitus, vertigo; No sinus or throat pain  Respiratory: No cough, wheezing, chills or hemoptysis  Cardiovascular: No chest pain, palpitations, dizziness or leg swelling  Gastrointestinal: No abdominal or epigastric pain. No nausea, vomiting or hematemesis; No diarrhea or constipation. No melena or hematochezia.  Skin: No itching, burning, rashes or lesions   Musculoskeletal: No joint pain or swelling; No muscle, back or extremity pain    PAST MEDICAL & SURGICAL HISTORY:  No pertinent past medical history      No significant past surgical history          FAMILY HISTORY:  No pertinent family history in first degree relatives        SOCIAL HISTORY:  Smoking Status: [ ] Current, [ ] Former, [ ] Never  Pack Years:  [  ] EtOH  [  ] IVDA    MEDICATIONS:  MEDICATIONS  (STANDING):  acetylcysteine IVPB 10 Gram(s) IV Intermittent every 24 hours  influenza   Vaccine 0.5 milliLiter(s) IntraMuscular once    MEDICATIONS  (PRN):  acetaminophen     Tablet .. 650 milliGRAM(s) Oral every 6 hours PRN Temp greater or equal to 38C (100.4F), Mild Pain (1 - 3), Moderate Pain (4 - 6)  hydrOXYzine hydrochloride 25 milliGRAM(s) Oral every 6 hours PRN Itching  ondansetron Injectable 4 milliGRAM(s) IV Push every 6 hours PRN Nausea and/or Vomiting      Allergies    tetracycline (Hives)    Intolerances        Vital Signs Last 24 Hrs  T(C): 36.9 (09 Dec 2023 08:00), Max: 36.9 (09 Dec 2023 08:00)  T(F): 98.5 (09 Dec 2023 08:00), Max: 98.5 (09 Dec 2023 08:00)  HR: 66 (09 Dec 2023 08:00) (56 - 68)  BP: 98/62 (09 Dec 2023 08:00) (98/62 - 103/65)  BP(mean): --  RR: 18 (09 Dec 2023 08:00) (18 - 18)  SpO2: 96% (09 Dec 2023 08:00) (95% - 97%)    Parameters below as of 09 Dec 2023 08:00  Patient On (Oxygen Delivery Method): room air        12-08 @ 07:01  -  12-09 @ 07:00  --------------------------------------------------------  IN: 0 mL / OUT: 800 mL / NET: -800 mL          PHYSICAL EXAM:    General:  in no acute distress  HEENT: MMM, conjunctiva and sclera clear  Gastrointestinal: Soft, non-tender non-distended; Normal bowel sounds; No rebound or guarding  Extremities: Normal range of motion, No clubbing, cyanosis or edema  Neurological: Alert and oriented x3  Skin: Warm and dry. No obvious rash      LABS:                        13.6   3.24  )-----------( 269      ( 09 Dec 2023 07:28 )             40.8     09 Dec 2023 07:28    138    |  101    |  5.6    ----------------------------<  91     4.4     |  24.0   |  0.55     Ca    10.1       09 Dec 2023 07:28  Mg     2.2       09 Dec 2023 07:28    TPro  6.7    /  Alb  3.8    /  TBili  7.0    /  DBili  x      /  AST  110    /  ALT  163    /  AlkPhos  198    / Amylase x      /Lipase x      09 Dec 2023 07:28      Cytomegalovirus By PCR: NotDetec IU/mL (12.08.23 @ 06:02)   Varicella IgG Antibody Result: 3262.0 Index (12.08.23 @ 00:57)   Ceruloplasmin, Serum: 30 mg/dL (12.08.23 @ 00:57)   Babs-Barr Virus Capsid Antigen IgG: Positive: Babs-Barr Virus VCA IgG Antibody   Babs-Barr Nuclear Antigen: Positive: Babs-Barr Virus NA IgG Antibody   Babs Barr Virus IgM Antibody: Negative: Basb-Barr Virus VCA IgM Antibody   Babs-Barr Virus Early Antigen: Positive: Babs-Barr Virus EA IgG Antibody   EBV VCA IgG EIA: 636.0 U/mL (12.08.23 @ 00:57)   EBNA IgG EIA: 43.3 U/mL (12.08.23 @ 00:57)   EBV VCA IgM EIA: 30.6 U/mL (12.08.23 @ 00:57)   EBV EA Ab EIA: 76.2 U/mL (12.08.23 @ 00:57)         RADIOLOGY & ADDITIONAL STUDIES:

## 2023-12-09 NOTE — DISCHARGE NOTE NURSING/CASE MANAGEMENT/SOCIAL WORK - PATIENT PORTAL LINK FT
You can access the FollowMyHealth Patient Portal offered by [facility] by registering at the following website: [email or web address]. By joining [employer]’s FollowMyHealth portal, you will also be able to view your health information using other applications (apps) compatible with our system. You can access the FollowMyHealth Patient Portal offered by [facility] by registering at the following website: [email or web address]. By joining [employer]’s FollowMyHealth portal, you will also be able to view your health information using other applications (apps) compatible with our system.

## 2023-12-09 NOTE — PROGRESS NOTE ADULT - ASSESSMENT
57 y/o F with no known sig. PMHx presents to ED c/o painless jaundice, improving.     LFTs remain elevated but stable, INR normal  Positive EBV titers, possible EBV reactivation  Additional labs still pending  Recent viral illness  Herbal supplements (goldenrod), forages for mushrooms (lion's frances and reishi)  CT A/P 12/7/23- no acute findings- normal liver, normal caliber bile ducts  Acute hepatitis panel negative  NAC completed, Ok to d/c with follow up in office with Dr. Chacko

## 2023-12-09 NOTE — DISCHARGE NOTE PROVIDER - CARE PROVIDER_API CALL
Khushboo Chacko  Transplant Hepatology  [address], [address]  [place] [address]  Phone: [phone]  Fax: [phone]  Follow Up Time: 2 weeks    Shmuel Mendez  Gastroenterology  [address] [place] [address]  Phone: [phone]  Fax: [phone]  Follow Up Time: 1 month    PCP,   Phone: (   )    -  Fax: (   )    -  Follow Up Time: 1 week   Khushboo Chacko  Transplant Hepatology  [address], [address]  [place] [address]  Phone: [phone]  Fax: [phone]  Follow Up Time: 2 weeks    Shmuel Mendez  Gastroenterology  [address] [place] [address]  Phone: [phone]  Fax: [phone]  Follow Up Time: 1 month    PCP,   Phone: (   )    -  Fax: (   )    -  Follow Up Time: 1 week

## 2023-12-09 NOTE — PROGRESS NOTE ADULT - NS ATTEND AMEND GEN_ALL_CORE FT
Clinically Lawanda is doing quite well.  She is eating with no problems.  Acute hepatitis studies so far are negative but most of them are still pending.  LFTs have improved slightly.  INR is stable and normal.  When she finishes the NAC tomorrow and if her liver function studies are normal she would be clear for discharge and to follow-up as an outpatient with us.
Lawanda continues to obviously have a hepatitic picture on her liver enzymes.  It is not progressive and I doubt it is related to her mushroom foraging.  More likely she has a viral etiology.  At this moment Babs-Hauser is looking like the most probable.  That being said she has been in the hospital several days with no worsening of her liver function studies or evidence of progression of the hepatitis toward acute liver failure.  I think she is clear for discharge today.  She should have repeat liver function studies in 5 to 7 days.  She should follow-up with Dr. Chacko in the office to go over the rest of the etiologic studies and be sure that her liver function studies ultimately returned completely to normal.

## 2023-12-09 NOTE — DISCHARGE NOTE PROVIDER - PROVIDER TOKENS
PROVIDER:[TOKEN:[[id number]],FOLLOWUP:[2 weeks]],PROVIDER:[TOKEN:[[id number]],FOLLOWUP:[1 month]],FREE:[LAST:[PCP],PHONE:[(   )    -],FAX:[(   )    -],FOLLOWUP:[1 week]] PROVIDER:[TOKEN:[[id number]],FOLLOWUP:[2 weeks]],PROVIDER:[TOKEN:[[id number]],FOLLOWUP:[1 month]],FREE:[LAST:[PCP],PHONE:[(   )    -],FAX:[(   )    -],FOLLOWUP:[1 week]]

## 2023-12-09 NOTE — DISCHARGE NOTE NURSING/CASE MANAGEMENT/SOCIAL WORK - NSDCPEFALRISK_GEN_ALL_CORE
For information on Fall & Injury Prevention, visit: [email or web address] OR  [email or web address] OR  https://www.cdc.gov/steadi/patient.html For information on Fall & Injury Prevention, visit: [email or web address] OR  [email or web address] OR  https://www.cdc.gov/steadi/patient.html

## 2023-12-10 LAB
ANA TITR SER: NEGATIVE — SIGNIFICANT CHANGE UP
ANA TITR SER: NEGATIVE — SIGNIFICANT CHANGE UP
HEV AB FLD QL: NEGATIVE — SIGNIFICANT CHANGE UP
HEV AB FLD QL: NEGATIVE — SIGNIFICANT CHANGE UP
HEV IGM SER QL: NEGATIVE — SIGNIFICANT CHANGE UP
HEV IGM SER QL: NEGATIVE — SIGNIFICANT CHANGE UP
MITOCHONDRIA AB SER-ACNC: SIGNIFICANT CHANGE UP
MITOCHONDRIA AB SER-ACNC: SIGNIFICANT CHANGE UP
SMOOTH MUSCLE AB SER-ACNC: SIGNIFICANT CHANGE UP
SMOOTH MUSCLE AB SER-ACNC: SIGNIFICANT CHANGE UP

## 2023-12-11 ENCOUNTER — NON-APPOINTMENT (OUTPATIENT)
Age: 58
End: 2023-12-11

## 2023-12-11 LAB
VZV DNA, PCR RESULT: NEGATIVE — SIGNIFICANT CHANGE UP
VZV DNA, PCR RESULT: NEGATIVE — SIGNIFICANT CHANGE UP

## 2023-12-12 LAB
ANNOTATION COMMENT IMP: SIGNIFICANT CHANGE UP
ANNOTATION COMMENT IMP: SIGNIFICANT CHANGE UP
HEV RNA SERPL NAA+PROBE-ACNC: SIGNIFICANT CHANGE UP IU/ML
HEV RNA SERPL NAA+PROBE-ACNC: SIGNIFICANT CHANGE UP IU/ML
HEV RNA SERPL NAA+PROBE-LOG IU: <3.3 — SIGNIFICANT CHANGE UP
HEV RNA SERPL NAA+PROBE-LOG IU: <3.3 — SIGNIFICANT CHANGE UP
SOLUBLE LIVER IGG SER IA-ACNC: 0.6 — SIGNIFICANT CHANGE UP (ref 0–20)
SOLUBLE LIVER IGG SER IA-ACNC: 0.6 — SIGNIFICANT CHANGE UP (ref 0–20)
SPECIMEN SOURCE: SIGNIFICANT CHANGE UP
SPECIMEN SOURCE: SIGNIFICANT CHANGE UP

## 2023-12-13 LAB
CELIAC DISEASE INTERPRETATION: SIGNIFICANT CHANGE UP
CELIAC DISEASE INTERPRETATION: SIGNIFICANT CHANGE UP
CELIAC GENE PAIRS PRESENT: NO — SIGNIFICANT CHANGE UP
CELIAC GENE PAIRS PRESENT: NO — SIGNIFICANT CHANGE UP
DQ ALPHA 1: SIGNIFICANT CHANGE UP
DQ ALPHA 1: SIGNIFICANT CHANGE UP
DQ BETA 1: SIGNIFICANT CHANGE UP
DQ BETA 1: SIGNIFICANT CHANGE UP
IMMUNOGLOBULIN A (IGA), S: 346 MG/DL — SIGNIFICANT CHANGE UP (ref 61–356)
IMMUNOGLOBULIN A (IGA), S: 346 MG/DL — SIGNIFICANT CHANGE UP (ref 61–356)

## 2023-12-14 ENCOUNTER — APPOINTMENT (OUTPATIENT)
Dept: GASTROENTEROLOGY | Facility: CLINIC | Age: 58
End: 2023-12-14
Payer: COMMERCIAL

## 2023-12-14 ENCOUNTER — LABORATORY RESULT (OUTPATIENT)
Age: 58
End: 2023-12-14

## 2023-12-14 VITALS
DIASTOLIC BLOOD PRESSURE: 63 MMHG | RESPIRATION RATE: 15 BRPM | BODY MASS INDEX: 23.99 KG/M2 | OXYGEN SATURATION: 98 % | HEIGHT: 65 IN | HEART RATE: 82 BPM | WEIGHT: 144 LBS | SYSTOLIC BLOOD PRESSURE: 100 MMHG

## 2023-12-14 DIAGNOSIS — Z87.891 PERSONAL HISTORY OF NICOTINE DEPENDENCE: ICD-10-CM

## 2023-12-14 DIAGNOSIS — Z78.9 OTHER SPECIFIED HEALTH STATUS: ICD-10-CM

## 2023-12-14 PROCEDURE — 99205 OFFICE O/P NEW HI 60 MIN: CPT

## 2023-12-14 RX ORDER — URSODIOL 500 MG/1
500 TABLET ORAL TWICE DAILY
Qty: 60 | Refills: 10 | Status: ACTIVE | COMMUNITY
Start: 2023-12-14 | End: 1900-01-01

## 2023-12-15 ENCOUNTER — TRANSCRIPTION ENCOUNTER (OUTPATIENT)
Age: 58
End: 2023-12-15

## 2023-12-15 ENCOUNTER — NON-APPOINTMENT (OUTPATIENT)
Age: 58
End: 2023-12-15

## 2023-12-15 ENCOUNTER — INPATIENT (INPATIENT)
Facility: HOSPITAL | Age: 58
LOS: 3 days | Discharge: HOME | DRG: 441 | End: 2023-12-19
Admitting: HOSPITALIST
Payer: COMMERCIAL

## 2023-12-15 VITALS
TEMPERATURE: 98 F | HEIGHT: 65 IN | HEART RATE: 81 BPM | OXYGEN SATURATION: 99 % | DIASTOLIC BLOOD PRESSURE: 77 MMHG | RESPIRATION RATE: 18 BRPM | SYSTOLIC BLOOD PRESSURE: 122 MMHG | WEIGHT: 147.71 LBS

## 2023-12-15 DIAGNOSIS — R17 UNSPECIFIED JAUNDICE: ICD-10-CM

## 2023-12-15 LAB
ALBUMIN SERPL ELPH-MCNC: 4.1 G/DL
ALBUMIN SERPL ELPH-MCNC: 4.2 G/DL — SIGNIFICANT CHANGE UP (ref 3.3–5.2)
ALBUMIN SERPL ELPH-MCNC: 4.2 G/DL — SIGNIFICANT CHANGE UP (ref 3.3–5.2)
ALP BLD-CCNC: 254 U/L
ALP SERPL-CCNC: 247 U/L — HIGH (ref 40–120)
ALP SERPL-CCNC: 247 U/L — HIGH (ref 40–120)
ALT FLD-CCNC: 121 U/L — HIGH
ALT FLD-CCNC: 121 U/L — HIGH
ALT SERPL-CCNC: 147 U/L
ANION GAP SERPL CALC-SCNC: 12 MMOL/L
ANION GAP SERPL CALC-SCNC: 13 MMOL/L — SIGNIFICANT CHANGE UP (ref 5–17)
ANION GAP SERPL CALC-SCNC: 13 MMOL/L — SIGNIFICANT CHANGE UP (ref 5–17)
APTT BLD: 31.2 SEC — SIGNIFICANT CHANGE UP (ref 24.5–35.6)
APTT BLD: 31.2 SEC — SIGNIFICANT CHANGE UP (ref 24.5–35.6)
AST SERPL-CCNC: 120 U/L
AST SERPL-CCNC: 99 U/L — HIGH
AST SERPL-CCNC: 99 U/L — HIGH
BASOPHILS # BLD AUTO: 0.04 K/UL — SIGNIFICANT CHANGE UP (ref 0–0.2)
BASOPHILS # BLD AUTO: 0.04 K/UL — SIGNIFICANT CHANGE UP (ref 0–0.2)
BASOPHILS # BLD AUTO: 0.05 K/UL
BASOPHILS NFR BLD AUTO: 1 % — SIGNIFICANT CHANGE UP (ref 0–2)
BASOPHILS NFR BLD AUTO: 1 % — SIGNIFICANT CHANGE UP (ref 0–2)
BASOPHILS NFR BLD AUTO: 1.2 %
BILIRUB DIRECT SERPL-MCNC: 10 MG/DL — HIGH (ref 0–0.3)
BILIRUB DIRECT SERPL-MCNC: 10 MG/DL — HIGH (ref 0–0.3)
BILIRUB INDIRECT FLD-MCNC: 3.2 MG/DL — SIGNIFICANT CHANGE UP (ref 0.2–1)
BILIRUB INDIRECT FLD-MCNC: 3.2 MG/DL — SIGNIFICANT CHANGE UP (ref 0.2–1)
BILIRUB INDIRECT SERPL-MCNC: 2.4 MG/DL
BILIRUB SERPL-MCNC: 12 MG/DL
BILIRUB SERPL-MCNC: 13.2 MG/DL — HIGH (ref 0.4–2)
BILIRUB SERPL-MCNC: 13.2 MG/DL — HIGH (ref 0.4–2)
BLD GP AB SCN SERPL QL: SIGNIFICANT CHANGE UP
BLD GP AB SCN SERPL QL: SIGNIFICANT CHANGE UP
BUN SERPL-MCNC: 7.2 MG/DL — LOW (ref 8–20)
BUN SERPL-MCNC: 7.2 MG/DL — LOW (ref 8–20)
BUN SERPL-MCNC: 8 MG/DL
CALCIUM SERPL-MCNC: 9.9 MG/DL
CALCIUM SERPL-MCNC: 9.9 MG/DL — SIGNIFICANT CHANGE UP (ref 8.4–10.5)
CALCIUM SERPL-MCNC: 9.9 MG/DL — SIGNIFICANT CHANGE UP (ref 8.4–10.5)
CHLORIDE SERPL-SCNC: 96 MMOL/L
CHLORIDE SERPL-SCNC: 99 MMOL/L — SIGNIFICANT CHANGE UP (ref 96–108)
CHLORIDE SERPL-SCNC: 99 MMOL/L — SIGNIFICANT CHANGE UP (ref 96–108)
CO2 SERPL-SCNC: 23 MMOL/L
CO2 SERPL-SCNC: 23 MMOL/L — SIGNIFICANT CHANGE UP (ref 22–29)
CO2 SERPL-SCNC: 23 MMOL/L — SIGNIFICANT CHANGE UP (ref 22–29)
CREAT SERPL-MCNC: 0.34 MG/DL — LOW (ref 0.5–1.3)
CREAT SERPL-MCNC: 0.34 MG/DL — LOW (ref 0.5–1.3)
CREAT SERPL-MCNC: 0.78 MG/DL
EGFR: 119 ML/MIN/1.73M2 — SIGNIFICANT CHANGE UP
EGFR: 119 ML/MIN/1.73M2 — SIGNIFICANT CHANGE UP
EGFR: 88 ML/MIN/1.73M2
EOSINOPHIL # BLD AUTO: 0.08 K/UL — SIGNIFICANT CHANGE UP (ref 0–0.5)
EOSINOPHIL # BLD AUTO: 0.08 K/UL — SIGNIFICANT CHANGE UP (ref 0–0.5)
EOSINOPHIL # BLD AUTO: 0.28 K/UL
EOSINOPHIL NFR BLD AUTO: 1.9 % — SIGNIFICANT CHANGE UP (ref 0–6)
EOSINOPHIL NFR BLD AUTO: 1.9 % — SIGNIFICANT CHANGE UP (ref 0–6)
EOSINOPHIL NFR BLD AUTO: 6.7 %
GLUCOSE SERPL-MCNC: 104 MG/DL
GLUCOSE SERPL-MCNC: 107 MG/DL — HIGH (ref 70–99)
GLUCOSE SERPL-MCNC: 107 MG/DL — HIGH (ref 70–99)
HBV CORE IGG+IGM SER QL: NONREACTIVE
HBV SURFACE AG SER QL: NONREACTIVE
HCT VFR BLD CALC: 37.7 % — SIGNIFICANT CHANGE UP (ref 34.5–45)
HCT VFR BLD CALC: 37.7 % — SIGNIFICANT CHANGE UP (ref 34.5–45)
HCT VFR BLD CALC: 37.8 %
HGB BLD-MCNC: 12.7 G/DL
HGB BLD-MCNC: 13.5 G/DL — SIGNIFICANT CHANGE UP (ref 11.5–15.5)
HGB BLD-MCNC: 13.5 G/DL — SIGNIFICANT CHANGE UP (ref 11.5–15.5)
HIV1+2 AB SPEC QL IA.RAPID: NONREACTIVE
HSV1-DNA: NOT DETECTED
HSV2-DNA: NOT DETECTED
IMM GRANULOCYTES NFR BLD AUTO: 0.2 % — SIGNIFICANT CHANGE UP (ref 0–0.9)
IMM GRANULOCYTES NFR BLD AUTO: 0.2 % — SIGNIFICANT CHANGE UP (ref 0–0.9)
IMM GRANULOCYTES NFR BLD AUTO: 0.5 %
INR BLD: 0.81 RATIO — LOW (ref 0.85–1.18)
INR BLD: 0.81 RATIO — LOW (ref 0.85–1.18)
INR PPP: 0.85 RATIO
LYMPHOCYTES # BLD AUTO: 0.9 K/UL — LOW (ref 1–3.3)
LYMPHOCYTES # BLD AUTO: 0.9 K/UL — LOW (ref 1–3.3)
LYMPHOCYTES # BLD AUTO: 1.15 K/UL
LYMPHOCYTES # BLD AUTO: 21.8 % — SIGNIFICANT CHANGE UP (ref 13–44)
LYMPHOCYTES # BLD AUTO: 21.8 % — SIGNIFICANT CHANGE UP (ref 13–44)
LYMPHOCYTES NFR BLD AUTO: 27.6 %
MAN DIFF?: NORMAL
MCHC RBC-ENTMCNC: 28.5 PG
MCHC RBC-ENTMCNC: 29.6 PG — SIGNIFICANT CHANGE UP (ref 27–34)
MCHC RBC-ENTMCNC: 29.6 PG — SIGNIFICANT CHANGE UP (ref 27–34)
MCHC RBC-ENTMCNC: 33.6 GM/DL
MCHC RBC-ENTMCNC: 35.8 GM/DL — SIGNIFICANT CHANGE UP (ref 32–36)
MCHC RBC-ENTMCNC: 35.8 GM/DL — SIGNIFICANT CHANGE UP (ref 32–36)
MCV RBC AUTO: 82.7 FL — SIGNIFICANT CHANGE UP (ref 80–100)
MCV RBC AUTO: 82.7 FL — SIGNIFICANT CHANGE UP (ref 80–100)
MCV RBC AUTO: 84.9 FL
MONOCYTES # BLD AUTO: 0.42 K/UL — SIGNIFICANT CHANGE UP (ref 0–0.9)
MONOCYTES # BLD AUTO: 0.42 K/UL — SIGNIFICANT CHANGE UP (ref 0–0.9)
MONOCYTES # BLD AUTO: 0.52 K/UL
MONOCYTES NFR BLD AUTO: 10.2 % — SIGNIFICANT CHANGE UP (ref 2–14)
MONOCYTES NFR BLD AUTO: 10.2 % — SIGNIFICANT CHANGE UP (ref 2–14)
MONOCYTES NFR BLD AUTO: 12.5 %
NEUTROPHILS # BLD AUTO: 2.14 K/UL
NEUTROPHILS # BLD AUTO: 2.67 K/UL — SIGNIFICANT CHANGE UP (ref 1.8–7.4)
NEUTROPHILS # BLD AUTO: 2.67 K/UL — SIGNIFICANT CHANGE UP (ref 1.8–7.4)
NEUTROPHILS NFR BLD AUTO: 51.5 %
NEUTROPHILS NFR BLD AUTO: 64.9 % — SIGNIFICANT CHANGE UP (ref 43–77)
NEUTROPHILS NFR BLD AUTO: 64.9 % — SIGNIFICANT CHANGE UP (ref 43–77)
PLATELET # BLD AUTO: 303 K/UL
PLATELET # BLD AUTO: 318 K/UL — SIGNIFICANT CHANGE UP (ref 150–400)
PLATELET # BLD AUTO: 318 K/UL — SIGNIFICANT CHANGE UP (ref 150–400)
POTASSIUM SERPL-MCNC: 4.2 MMOL/L — SIGNIFICANT CHANGE UP (ref 3.5–5.3)
POTASSIUM SERPL-MCNC: 4.2 MMOL/L — SIGNIFICANT CHANGE UP (ref 3.5–5.3)
POTASSIUM SERPL-SCNC: 4.2 MMOL/L — SIGNIFICANT CHANGE UP (ref 3.5–5.3)
POTASSIUM SERPL-SCNC: 4.2 MMOL/L — SIGNIFICANT CHANGE UP (ref 3.5–5.3)
POTASSIUM SERPL-SCNC: 4.5 MMOL/L
PROT SERPL-MCNC: 6.6 G/DL — SIGNIFICANT CHANGE UP (ref 6.6–8.7)
PROT SERPL-MCNC: 6.6 G/DL — SIGNIFICANT CHANGE UP (ref 6.6–8.7)
PROT SERPL-MCNC: 6.9 G/DL
PROTHROM AB SERPL-ACNC: 9 SEC — LOW (ref 9.5–13)
PROTHROM AB SERPL-ACNC: 9 SEC — LOW (ref 9.5–13)
PT BLD: 9.6 SEC
RBC # BLD: 4.45 M/UL
RBC # BLD: 4.56 M/UL — SIGNIFICANT CHANGE UP (ref 3.8–5.2)
RBC # BLD: 4.56 M/UL — SIGNIFICANT CHANGE UP (ref 3.8–5.2)
RBC # FLD: 14.6 % — HIGH (ref 10.3–14.5)
RBC # FLD: 14.6 % — HIGH (ref 10.3–14.5)
RBC # FLD: 15 %
SODIUM SERPL-SCNC: 131 MMOL/L
SODIUM SERPL-SCNC: 135 MMOL/L — SIGNIFICANT CHANGE UP (ref 135–145)
SODIUM SERPL-SCNC: 135 MMOL/L — SIGNIFICANT CHANGE UP (ref 135–145)
T PALLIDUM AB SER QL IA: NEGATIVE
WBC # BLD: 4.12 K/UL — SIGNIFICANT CHANGE UP (ref 3.8–10.5)
WBC # BLD: 4.12 K/UL — SIGNIFICANT CHANGE UP (ref 3.8–10.5)
WBC # FLD AUTO: 4.12 K/UL — SIGNIFICANT CHANGE UP (ref 3.8–10.5)
WBC # FLD AUTO: 4.12 K/UL — SIGNIFICANT CHANGE UP (ref 3.8–10.5)
WBC # FLD AUTO: 4.16 K/UL

## 2023-12-15 PROCEDURE — 99285 EMERGENCY DEPT VISIT HI MDM: CPT

## 2023-12-15 RX ORDER — INFLUENZA VIRUS VACCINE 15; 15; 15; 15 UG/.5ML; UG/.5ML; UG/.5ML; UG/.5ML
0.5 SUSPENSION INTRAMUSCULAR ONCE
Refills: 0 | Status: DISCONTINUED | OUTPATIENT
Start: 2023-12-15 | End: 2023-12-19

## 2023-12-15 NOTE — ED ADULT NURSE NOTE - OBJECTIVE STATEMENT
Assumed pt care 2009. A&Ox4. Pt sent by doctor for new onset of jaundice. Pt states he was just admitted couple days ago for elevated bilirubin 12. Pt denies any CP, SOB, HA, dizziness, vision changes, abd pain, NVD, fever/chills, cough, and urinary symptoms at this time. Resp even and unlabored. NAD present. No PMH. Respirations even & unlabored. Pt made aware of plan of care and verbalized understanding.

## 2023-12-15 NOTE — ED PROVIDER NOTE - PHYSICAL EXAMINATION
Gen: No acute distress, non toxic  HEENT: Mucous membranes moist,EOMI. +scleral icterus  CV: RRR, nl s1/s2.  Resp: CTAB, normal rate and effort  GI: Abdomen soft, NT, ND. No rebound, no guarding  : No CVAT  Neuro: A&O x 3, moving all 4 extremities  MSK: No spine or joint tenderness to palpation  Skin: No rashes. intact and perfused. jaundice.

## 2023-12-15 NOTE — ED PROVIDER NOTE - CLINICAL SUMMARY MEDICAL DECISION MAKING FREE TEXT BOX
57 y/o female here for painless jaundice, sent in by Dr. Chacko for mrcp and liver biopsy for increased bili. no other complaints. labs, admission.

## 2023-12-15 NOTE — ED ADULT TRIAGE NOTE - NS ED TRIAGE AVPU SCALE
Alert-The patient is alert, awake and responds to voice. The patient is oriented to time, place, and person. The triage nurse is able to obtain subjective information.
26-Feb-2023 00:05

## 2023-12-15 NOTE — ED ADULT NURSE NOTE - ED STAT RN HANDOFF DETAILS
Report given to CDU NI Brito. Pt is resting in stretcher comfortably at this time, no apparent distress noted at this time. Pt safety maintained. Pt denies any complaints at this time.

## 2023-12-15 NOTE — ED PROVIDER NOTE - OBJECTIVE STATEMENT
59 y/o female no pmh sent by Dr Chacko for mrcp and liver biopsy in setting of new painless jaundice. admitted several days ago, ct without mass, found to have EBV only, no other findings. bili increased from 7 to 12, only c/o itchiness, no pain, no f/c. no other complaints.

## 2023-12-15 NOTE — ED ADULT NURSE NOTE - NSFALLUNIVINTERV_ED_ALL_ED
Bed/Stretcher in lowest position, wheels locked, appropriate side rails in place/Call bell, personal items and telephone in reach/Instruct patient to call for assistance before getting out of bed/chair/stretcher/Non-slip footwear applied when patient is off stretcher/[place] to call system/Physically safe environment - no spills, clutter or unnecessary equipment/Purposeful proactive rounding/Room/bathroom lighting operational, light cord in reach Bed/Stretcher in lowest position, wheels locked, appropriate side rails in place/Call bell, personal items and telephone in reach/Instruct patient to call for assistance before getting out of bed/chair/stretcher/Non-slip footwear applied when patient is off stretcher/[place] to call system/Physically safe environment - no spills, clutter or unnecessary equipment/Purposeful proactive rounding/Room/bathroom lighting operational, light cord in reach

## 2023-12-15 NOTE — PATIENT PROFILE ADULT - FALL HARM RISK - UNIVERSAL INTERVENTIONS
Bed in lowest position, wheels locked, appropriate side rails in place/Call bell, personal items and telephone in reach/Instruct patient to call for assistance before getting out of bed or chair/Non-slip footwear when patient is out of bed/[place] to call system/Physically safe environment - no spills, clutter or unnecessary equipment/Purposeful Proactive Rounding/Room/bathroom lighting operational, light cord in reach Bed in lowest position, wheels locked, appropriate side rails in place/Call bell, personal items and telephone in reach/Instruct patient to call for assistance before getting out of bed or chair/Non-slip footwear when patient is out of bed/[place] to call system/Physically safe environment - no spills, clutter or unnecessary equipment/Purposeful Proactive Rounding/Room/bathroom lighting operational, light cord in reach

## 2023-12-16 LAB
ALBUMIN SERPL ELPH-MCNC: 3.9 G/DL — SIGNIFICANT CHANGE UP (ref 3.3–5.2)
ALBUMIN SERPL ELPH-MCNC: 3.9 G/DL — SIGNIFICANT CHANGE UP (ref 3.3–5.2)
ALP SERPL-CCNC: 241 U/L — HIGH (ref 40–120)
ALP SERPL-CCNC: 241 U/L — HIGH (ref 40–120)
ALT FLD-CCNC: 115 U/L — HIGH
ALT FLD-CCNC: 115 U/L — HIGH
ANION GAP SERPL CALC-SCNC: 14 MMOL/L — SIGNIFICANT CHANGE UP (ref 5–17)
ANION GAP SERPL CALC-SCNC: 14 MMOL/L — SIGNIFICANT CHANGE UP (ref 5–17)
AST SERPL-CCNC: 100 U/L — HIGH
AST SERPL-CCNC: 100 U/L — HIGH
BASOPHILS # BLD AUTO: 0.05 K/UL — SIGNIFICANT CHANGE UP (ref 0–0.2)
BASOPHILS # BLD AUTO: 0.05 K/UL — SIGNIFICANT CHANGE UP (ref 0–0.2)
BASOPHILS NFR BLD AUTO: 1.3 % — SIGNIFICANT CHANGE UP (ref 0–2)
BASOPHILS NFR BLD AUTO: 1.3 % — SIGNIFICANT CHANGE UP (ref 0–2)
BILIRUB DIRECT SERPL-MCNC: >10 MG/DL — HIGH (ref 0–0.3)
BILIRUB DIRECT SERPL-MCNC: >10 MG/DL — HIGH (ref 0–0.3)
BILIRUB SERPL-MCNC: 14.5 MG/DL — HIGH (ref 0.4–2)
BILIRUB SERPL-MCNC: 14.5 MG/DL — HIGH (ref 0.4–2)
BUN SERPL-MCNC: 6.6 MG/DL — LOW (ref 8–20)
BUN SERPL-MCNC: 6.6 MG/DL — LOW (ref 8–20)
CALCIUM SERPL-MCNC: 10 MG/DL — SIGNIFICANT CHANGE UP (ref 8.4–10.5)
CALCIUM SERPL-MCNC: 10 MG/DL — SIGNIFICANT CHANGE UP (ref 8.4–10.5)
CHLORIDE SERPL-SCNC: 98 MMOL/L — SIGNIFICANT CHANGE UP (ref 96–108)
CHLORIDE SERPL-SCNC: 98 MMOL/L — SIGNIFICANT CHANGE UP (ref 96–108)
CO2 SERPL-SCNC: 24 MMOL/L — SIGNIFICANT CHANGE UP (ref 22–29)
CO2 SERPL-SCNC: 24 MMOL/L — SIGNIFICANT CHANGE UP (ref 22–29)
CREAT SERPL-MCNC: 0.32 MG/DL — LOW (ref 0.5–1.3)
CREAT SERPL-MCNC: 0.32 MG/DL — LOW (ref 0.5–1.3)
EGFR: 121 ML/MIN/1.73M2 — SIGNIFICANT CHANGE UP
EGFR: 121 ML/MIN/1.73M2 — SIGNIFICANT CHANGE UP
EOSINOPHIL # BLD AUTO: 0.25 K/UL — SIGNIFICANT CHANGE UP (ref 0–0.5)
EOSINOPHIL # BLD AUTO: 0.25 K/UL — SIGNIFICANT CHANGE UP (ref 0–0.5)
EOSINOPHIL NFR BLD AUTO: 6.4 % — HIGH (ref 0–6)
EOSINOPHIL NFR BLD AUTO: 6.4 % — HIGH (ref 0–6)
GLUCOSE SERPL-MCNC: 95 MG/DL — SIGNIFICANT CHANGE UP (ref 70–99)
GLUCOSE SERPL-MCNC: 95 MG/DL — SIGNIFICANT CHANGE UP (ref 70–99)
HCT VFR BLD CALC: 39.1 % — SIGNIFICANT CHANGE UP (ref 34.5–45)
HCT VFR BLD CALC: 39.1 % — SIGNIFICANT CHANGE UP (ref 34.5–45)
HGB BLD-MCNC: 13.8 G/DL — SIGNIFICANT CHANGE UP (ref 11.5–15.5)
HGB BLD-MCNC: 13.8 G/DL — SIGNIFICANT CHANGE UP (ref 11.5–15.5)
IMM GRANULOCYTES NFR BLD AUTO: 0.5 % — SIGNIFICANT CHANGE UP (ref 0–0.9)
IMM GRANULOCYTES NFR BLD AUTO: 0.5 % — SIGNIFICANT CHANGE UP (ref 0–0.9)
INR BLD: 0.86 RATIO — SIGNIFICANT CHANGE UP (ref 0.85–1.18)
INR BLD: 0.86 RATIO — SIGNIFICANT CHANGE UP (ref 0.85–1.18)
LYMPHOCYTES # BLD AUTO: 1.15 K/UL — SIGNIFICANT CHANGE UP (ref 1–3.3)
LYMPHOCYTES # BLD AUTO: 1.15 K/UL — SIGNIFICANT CHANGE UP (ref 1–3.3)
LYMPHOCYTES # BLD AUTO: 29.3 % — SIGNIFICANT CHANGE UP (ref 13–44)
LYMPHOCYTES # BLD AUTO: 29.3 % — SIGNIFICANT CHANGE UP (ref 13–44)
MCHC RBC-ENTMCNC: 29.3 PG — SIGNIFICANT CHANGE UP (ref 27–34)
MCHC RBC-ENTMCNC: 29.3 PG — SIGNIFICANT CHANGE UP (ref 27–34)
MCHC RBC-ENTMCNC: 35.3 GM/DL — SIGNIFICANT CHANGE UP (ref 32–36)
MCHC RBC-ENTMCNC: 35.3 GM/DL — SIGNIFICANT CHANGE UP (ref 32–36)
MCV RBC AUTO: 83 FL — SIGNIFICANT CHANGE UP (ref 80–100)
MCV RBC AUTO: 83 FL — SIGNIFICANT CHANGE UP (ref 80–100)
MONOCYTES # BLD AUTO: 0.53 K/UL — SIGNIFICANT CHANGE UP (ref 0–0.9)
MONOCYTES # BLD AUTO: 0.53 K/UL — SIGNIFICANT CHANGE UP (ref 0–0.9)
MONOCYTES NFR BLD AUTO: 13.5 % — SIGNIFICANT CHANGE UP (ref 2–14)
MONOCYTES NFR BLD AUTO: 13.5 % — SIGNIFICANT CHANGE UP (ref 2–14)
NEUTROPHILS # BLD AUTO: 1.92 K/UL — SIGNIFICANT CHANGE UP (ref 1.8–7.4)
NEUTROPHILS # BLD AUTO: 1.92 K/UL — SIGNIFICANT CHANGE UP (ref 1.8–7.4)
NEUTROPHILS NFR BLD AUTO: 49 % — SIGNIFICANT CHANGE UP (ref 43–77)
NEUTROPHILS NFR BLD AUTO: 49 % — SIGNIFICANT CHANGE UP (ref 43–77)
PLATELET # BLD AUTO: 339 K/UL — SIGNIFICANT CHANGE UP (ref 150–400)
PLATELET # BLD AUTO: 339 K/UL — SIGNIFICANT CHANGE UP (ref 150–400)
POTASSIUM SERPL-MCNC: 3.8 MMOL/L — SIGNIFICANT CHANGE UP (ref 3.5–5.3)
POTASSIUM SERPL-MCNC: 3.8 MMOL/L — SIGNIFICANT CHANGE UP (ref 3.5–5.3)
POTASSIUM SERPL-SCNC: 3.8 MMOL/L — SIGNIFICANT CHANGE UP (ref 3.5–5.3)
POTASSIUM SERPL-SCNC: 3.8 MMOL/L — SIGNIFICANT CHANGE UP (ref 3.5–5.3)
PROT SERPL-MCNC: 6.9 G/DL — SIGNIFICANT CHANGE UP (ref 6.6–8.7)
PROT SERPL-MCNC: 6.9 G/DL — SIGNIFICANT CHANGE UP (ref 6.6–8.7)
PROTHROM AB SERPL-ACNC: 9.6 SEC — SIGNIFICANT CHANGE UP (ref 9.5–13)
PROTHROM AB SERPL-ACNC: 9.6 SEC — SIGNIFICANT CHANGE UP (ref 9.5–13)
RBC # BLD: 4.71 M/UL — SIGNIFICANT CHANGE UP (ref 3.8–5.2)
RBC # BLD: 4.71 M/UL — SIGNIFICANT CHANGE UP (ref 3.8–5.2)
RBC # FLD: 14.8 % — HIGH (ref 10.3–14.5)
RBC # FLD: 14.8 % — HIGH (ref 10.3–14.5)
SODIUM SERPL-SCNC: 136 MMOL/L — SIGNIFICANT CHANGE UP (ref 135–145)
SODIUM SERPL-SCNC: 136 MMOL/L — SIGNIFICANT CHANGE UP (ref 135–145)
WBC # BLD: 3.92 K/UL — SIGNIFICANT CHANGE UP (ref 3.8–10.5)
WBC # BLD: 3.92 K/UL — SIGNIFICANT CHANGE UP (ref 3.8–10.5)
WBC # FLD AUTO: 3.92 K/UL — SIGNIFICANT CHANGE UP (ref 3.8–10.5)
WBC # FLD AUTO: 3.92 K/UL — SIGNIFICANT CHANGE UP (ref 3.8–10.5)

## 2023-12-16 PROCEDURE — 99223 1ST HOSP IP/OBS HIGH 75: CPT | Mod: GC

## 2023-12-16 PROCEDURE — 74181 MRI ABDOMEN W/O CONTRAST: CPT | Mod: 26

## 2023-12-16 PROCEDURE — 99223 1ST HOSP IP/OBS HIGH 75: CPT

## 2023-12-16 RX ORDER — HYDROXYZINE HCL 10 MG
25 TABLET ORAL
Refills: 0 | Status: DISCONTINUED | OUTPATIENT
Start: 2023-12-16 | End: 2023-12-19

## 2023-12-16 RX ORDER — DIPHENHYDRAMINE HCL 50 MG
25 CAPSULE ORAL EVERY 4 HOURS
Refills: 0 | Status: DISCONTINUED | OUTPATIENT
Start: 2023-12-16 | End: 2023-12-16

## 2023-12-16 RX ORDER — NYSTATIN CREAM 100000 [USP'U]/G
1 CREAM TOPICAL
Refills: 0 | Status: DISCONTINUED | OUTPATIENT
Start: 2023-12-16 | End: 2023-12-19

## 2023-12-16 RX ORDER — ONDANSETRON 8 MG/1
4 TABLET, FILM COATED ORAL EVERY 8 HOURS
Refills: 0 | Status: DISCONTINUED | OUTPATIENT
Start: 2023-12-16 | End: 2023-12-19

## 2023-12-16 RX ORDER — DIPHENHYDRAMINE HCL 50 MG
25 CAPSULE ORAL ONCE
Refills: 0 | Status: COMPLETED | OUTPATIENT
Start: 2023-12-16 | End: 2023-12-16

## 2023-12-16 RX ORDER — LANOLIN ALCOHOL/MO/W.PET/CERES
3 CREAM (GRAM) TOPICAL AT BEDTIME
Refills: 0 | Status: DISCONTINUED | OUTPATIENT
Start: 2023-12-16 | End: 2023-12-19

## 2023-12-16 RX ORDER — SODIUM CHLORIDE 9 MG/ML
1000 INJECTION INTRAMUSCULAR; INTRAVENOUS; SUBCUTANEOUS
Refills: 0 | Status: COMPLETED | OUTPATIENT
Start: 2023-12-16 | End: 2023-12-16

## 2023-12-16 RX ADMIN — Medication 25 MILLIGRAM(S): at 01:42

## 2023-12-16 RX ADMIN — Medication 0.5 MILLIGRAM(S): at 11:01

## 2023-12-16 RX ADMIN — Medication 0.5 MILLIGRAM(S): at 20:43

## 2023-12-16 RX ADMIN — SODIUM CHLORIDE 100 MILLILITER(S): 9 INJECTION INTRAMUSCULAR; INTRAVENOUS; SUBCUTANEOUS at 04:55

## 2023-12-16 NOTE — H&P ADULT - NSHPPHYSICALEXAM_GEN_ALL_CORE
T(C): 36.9 (12-15-23 @ 17:14), Max: 36.9 (12-15-23 @ 17:14)  HR: 82 (12-15-23 @ 22:29) (81 - 82)  BP: 124/72 (12-15-23 @ 22:29) (122/77 - 124/72)  RR: 18 (12-15-23 @ 22:29) (18 - 18)  SpO2: 99% (12-15-23 @ 22:29) (99% - 99%)    CONSTITUTIONAL: Well groomed, no apparent distress, Icteric over face, neck, and palms+   EYES: PERRLA and symmetric, EOMI.  Icteric conjunctiva or sclera  ENMT: Oral mucosa with moist icteric membranes. icteric pharyngeal mucosa  RESP: No respiratory distress, no use of accessory muscles; CTA b/l, no WRR  CV: RRR, +S1S2, no MRG; no JVD; no peripheral edema  GI: Soft, NT, ND, no rebound, no guarding  MSK: Normal gait  SKIN: No rashes or ulcers noted, icteric skin and mucosa  NEURO: CN II-XII intact; normal power, sensation intact in upper and lower extremities b/l to light touch   PSYCH: Appropriate insight/judgment; A+O x 3, mood and affect appropriate, recent/remote memory intact

## 2023-12-16 NOTE — H&P ADULT - NSHPLABSRESULTS_GEN_ALL_CORE
Hepatic Function Panel (12.15.23 @ 20:09)   Indirect Reacting Bilirubin: 3.2 mg/dL  Protein Total: 6.6 g/dL  Albumin: 4.2 g/dL  Bilirubin Total: 13.2 mg/dL  Bilirubin Direct: 10.0 mg/dL  Alkaline Phosphatase: 247 U/L  Aspartate Aminotransferase (AST/SGOT): 99 U/L  Alanine Aminotransferase (ALT/SGPT): 121 U/LBasic Metabolic Panel (12.15.23 @ 20:09)   Sodium: 135 mmol/L  Potassium: 4.2 mmol/L  Chloride: 99: Chloride reference range changed from ..10/26/2022 mmol/L  Carbon Dioxide: 23.0 mmol/L  Anion Gap: 13 mmol/L  Blood Urea Nitrogen: 7.2 mg/dL  Creatinine: 0.34 mg/dL  Glucose: 107 mg/dL  Calcium: 9.9 mg/dL  eGFR: 119:Prothrombin Time and INR, Plasma (12.15.23 @ 20:09)   Prothrombin Time, Plasma: 9.0: specimen checked for clot 12/15/2023 20:58:42 EST sec  INR: 0.81: Recommended targets/ranges for therapeutic INR: Complete Blood Count + Automated Diff (12.15.23 @ 20:09)   WBC Count: 4.12 K/uL  RBC Count: 4.56 M/uL  Hemoglobin: 13.5 g/dL  Hematocrit: 37.7 %  Mean Cell Volume: 82.7 fl  Mean Cell Hemoglobin: 29.6 pg  Mean Cell Hemoglobin Conc: 35.8 gm/dL  Red Cell Distrib Width: 14.6 %  Platelet Count - Automated: 318 K/uL  Auto Neutrophil #: 2.67 K/uL  Auto Lymphocyte #: 0.90 K/uL  Auto Monocyte #: 0.42 K/uL  Auto Eosinophil #: 0.08 K/uL  Auto Basophil #: 0.04 K/uL  Auto Neutrophil %: 64.9: Differential percentages must be correlated with absolute numbers for < from: CT Abdomen and Pelvis w/ IV Cont (12.07.23 @ 00:40) >    PROCEDURE:  CT of the Abdomen and Pelvis was performed.  Sagittal and coronal reformats were performed.    FINDINGS:    LOWER CHEST: Within normal limits.    LIVER: Within normal limits.  BILE DUCTS: Normal caliber.  GALLBLADDER: Within normal limits.  SPLEEN: Within normal limits.  PANCREAS: Within normal limits.  ADRENALS: Within normal limits.  KIDNEYS/URETERS: Enhance symmetrically. No hydronephrosis. A   subcentimeter hypodensity in the left kidney is too small to characterize.    BLADDER: Within normal limits.  REPRODUCTIVE ORGANS: Uterus and adnexa are grossly unremarkable.    BOWEL: No bowel obstruction. Appendix is unremarkable.  PERITONEUM: No ascites.  VESSELS: Normal caliber abdominal aorta.  RETROPERITONEUM/LYMPH NODES: No lymphadenopathy.  ABDOMINAL WALL: Within normal limits.  BONES: Mild degenerative changes in the spine.    IMPRESSION:    No acute CT findings in the abdomen or pelvis. No CT findings to explain   jaundice.    < end of copied text >

## 2023-12-16 NOTE — H&P ADULT - ASSESSMENT
58/F no PMHx was sent in by Hepatologist, Dr. Chacko for elevation in bilirubin for an MRCP and liver biopsy. No definitive etiology for jaundice or hepatitis.    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 Chacko for elevation in bilirubin for an MRCP and liver biopsy. No definitive etiology for jaundice or hepatitis.    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 Chacko for elevation in bilirubin for an MRCP and liver biopsy. No definitive etiology for jaundice or hepatitis.    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

## 2023-12-16 NOTE — CONSULT NOTE ADULT - SUBJECTIVE AND OBJECTIVE BOX
HISTORY OF PRESENT ILLNESS: 58/F no PMHx presented to ED after referred by hepatologist Dr. Chacko for worsening hyperbilirubinemia. Patient was recently admitted to [facility] (12/06- 12/09) with painless jaundice found to have transaminitis. Her T bili/ AST/ ALT was 7.0/ 110/163,  on discharge (12/09) and repeat yesterday was T Bili 13, AST/ /83, . Normal INR, no thrombocytopenia.  CT A+P with no acute findings or masses. Patient reports stopping all the herbal agents after recent hospital stay (she does forage for mushrooms (lion's frances and reishi), she also takes goldenrod plant). Patient reports worsening generalized weakness, Denies nausea, vomiting, abdominal pian, fever, chills. Patient denies use of alcohol, smoking, herbal use. She has not had an EGD, her last colonoscopy was June 2022 which showed polyps per patient, she follows with Dr. Mendoza from [facility]. She does not have a family history of liver disease. She does not use OTC or prescription medications.     Review of Systems:  . Constitutional: No fever, chills  . HEENT: Negative  · Respiratory and Thorax: No shortness of breath, no cough  · Cardiovascular: No chest pain, palpitation, no dizziness  · Gastrointestinal: see above  · Genitourinary: No hematuria  · Musculoskeletal: Negative  · Neurological: negative  · Psychiatric: no agitation, no anxiety      PAST MEDICAL/SURGICAL HISTORY:  Elevated bilirubin    No significant past surgical history      SOCIAL HISTORY:  - TOBACCO: Denies  - ALCOHOL: Denies  - ILLICIT DRUG USE: Denies    FAMILY HISTORY:  No known history of gastrointestinal or liver disease;  No pertinent family history in first degree relatives        HOME MEDICATIONS:    INPATIENT MEDICATIONS:  MEDICATIONS  (STANDING):  influenza   Vaccine 0.5 milliLiter(s) IntraMuscular once    MEDICATIONS  (PRN):  aluminum hydroxide/magnesium hydroxide/simethicone Suspension 30 milliLiter(s) Oral every 4 hours PRN Dyspepsia  hydrOXYzine hydrochloride 25 milliGRAM(s) Oral four times a day PRN Itching  melatonin 3 milliGRAM(s) Oral at bedtime PRN Insomnia  ondansetron Injectable 4 milliGRAM(s) IV Push every 8 hours PRN Nausea and/or Vomiting    ALLERGIES:  tetracycline (Hives)    T(C): 36.6 (12-16-23 @ 07:53), Max: 36.9 (12-15-23 @ 17:14)  HR: 71 (12-16-23 @ 07:53) (67 - 82)  BP: 113/67 (12-16-23 @ 07:53) (110/68 - 124/72)  RR: 18 (12-16-23 @ 07:53) (17 - 18)  SpO2: 99% (12-16-23 @ 07:53) (97% - 99%)      PHYSICAL EXAM:    Constitutional: No acute distress  Neuro: Awake alert, oriented  HEENT: anicteric sclerae  CV: regular rate, regular rhythm  Pulm/chest: lung sounds clear/diminished bilaterally, no accessory muscle use noted  Abd: soft, nontender, nondistended +bowel sounds. No rigidity, rebound tenderness, or guarding  Ext: no edema  Skin: warm, no jaundice   Psych: calm, cooperative        LABS:             13.5   4.12  )-----------( 318      ( 12-15 @ 20:09 )             37.7       PT/INR - ( 15 Dec 2023 20:09 )   PT: 9.0 sec;   INR: 0.81 ratio         PTT - ( 15 Dec 2023 20:09 )  PTT:31.2 sec  12-15    135  |  99  |  7.2<L>  ----------------------------<  107<H>  4.2   |  23.0  |  0.34<L>    Ca    9.9      15 Dec 2023 20:09    TPro  6.6  /  Alb  4.2  /  TBili  13.2<H>  /  DBili  10.0<H>  /  AST  99<H>  /  ALT  121<H>  /  AlkPhos  247<H>  12-15    LIVER FUNCTIONS - ( 15 Dec 2023 20:09 )  Alb: 4.2 g/dL / Pro: 6.6 g/dL / ALK PHOS: 247 U/L / ALT: 121 U/L / AST: 99 U/L / GGT: x               Urinalysis Basic - ( 15 Dec 2023 20:09 )    Color: x / Appearance: x / SG: x / pH: x  Gluc: 107 mg/dL / Ketone: x  / Bili: x / Urobili: x   Blood: x / Protein: x / Nitrite: x   Leuk Esterase: x / RBC: x / WBC x   Sq Epi: x / Non Sq Epi: x / Bacteria: x    < from: CT Abdomen and Pelvis w/ IV Cont (12.07.23 @ 00:40) >  FINDINGS:    LOWER CHEST: Within normal limits.    LIVER: Within normal limits.  BILE DUCTS: Normal caliber.  GALLBLADDER: Within normal limits.  SPLEEN: Within normal limits.  PANCREAS: Within normal limits.  ADRENALS: Within normal limits.  KIDNEYS/URETERS: Enhance symmetrically. No hydronephrosis. A   subcentimeter hypodensity in the left kidney is too small to characterize.    BLADDER: Within normal limits.  REPRODUCTIVE ORGANS: Uterus and adnexa are grossly unremarkable.    BOWEL: No bowel obstruction. Appendix is unremarkable.  PERITONEUM: No ascites.  VESSELS: Normal caliber abdominal aorta.  RETROPERITONEUM/LYMPH NODES: No lymphadenopathy.  ABDOMINAL WALL: Within normal limits.  BONES: Mild degenerative changes in the spine.    IMPRESSION:    No acute CT findings in the abdomen or pelvis. No CT findings to explain   jaundice.    < end of copied text >       HISTORY OF PRESENT ILLNESS: 58/F no PMHx presented to ED after referred by hepatologist Dr. Chacko for worsening hyperbilirubinemia. Patient was recently admitted to [facility] (12/06- 12/09) with painless jaundice found to have transaminitis. Her T bili/ AST/ ALT was 7.0/ 110/163,  on discharge (12/09) and repeat yesterday was T Bili 13, AST/ /83, . Normal INR, no thrombocytopenia.  CT A+P with no acute findings or masses. Patient reports stopping all the herbal agents after recent hospital stay (she does forage for mushrooms (lion's frances and reishi), she also takes goldenrod plant). Patient reports worsening generalized weakness, Denies nausea, vomiting, abdominal pian, fever, chills. Patient denies use of alcohol, smoking, herbal use. She has not had an EGD, her last colonoscopy was June 2022 which showed polyps per patient, she follows with Dr. Mendoza from [facility]. She does not have a family history of liver disease. She does not use OTC or prescription medications.     Review of Systems:  . Constitutional: No fever, chills  . HEENT: Negative  · Respiratory and Thorax: No shortness of breath, no cough  · Cardiovascular: No chest pain, palpitation, no dizziness  · Gastrointestinal: see above  · Genitourinary: No hematuria  · Musculoskeletal: Negative  · Neurological: negative  · Psychiatric: no agitation, no anxiety      PAST MEDICAL/SURGICAL HISTORY:  Elevated bilirubin    No significant past surgical history      SOCIAL HISTORY:  - TOBACCO: Denies  - ALCOHOL: Denies  - ILLICIT DRUG USE: Denies    FAMILY HISTORY:  No known history of gastrointestinal or liver disease;  No pertinent family history in first degree relatives        HOME MEDICATIONS:    INPATIENT MEDICATIONS:  MEDICATIONS  (STANDING):  influenza   Vaccine 0.5 milliLiter(s) IntraMuscular once    MEDICATIONS  (PRN):  aluminum hydroxide/magnesium hydroxide/simethicone Suspension 30 milliLiter(s) Oral every 4 hours PRN Dyspepsia  hydrOXYzine hydrochloride 25 milliGRAM(s) Oral four times a day PRN Itching  melatonin 3 milliGRAM(s) Oral at bedtime PRN Insomnia  ondansetron Injectable 4 milliGRAM(s) IV Push every 8 hours PRN Nausea and/or Vomiting    ALLERGIES:  tetracycline (Hives)    T(C): 36.6 (12-16-23 @ 07:53), Max: 36.9 (12-15-23 @ 17:14)  HR: 71 (12-16-23 @ 07:53) (67 - 82)  BP: 113/67 (12-16-23 @ 07:53) (110/68 - 124/72)  RR: 18 (12-16-23 @ 07:53) (17 - 18)  SpO2: 99% (12-16-23 @ 07:53) (97% - 99%)      PHYSICAL EXAM:    Constitutional: No acute distress  Neuro: Awake alert, oriented  HEENT: anicteric sclerae  CV: regular rate, regular rhythm  Pulm/chest: lung sounds clear/diminished bilaterally, no accessory muscle use noted  Abd: soft, nontender, nondistended +bowel sounds. No rigidity, rebound tenderness, or guarding  Ext: no edema  Skin: warm, no jaundice   Psych: calm, cooperative        LABS:             13.5   4.12  )-----------( 318      ( 12-15 @ 20:09 )             37.7       PT/INR - ( 15 Dec 2023 20:09 )   PT: 9.0 sec;   INR: 0.81 ratio         PTT - ( 15 Dec 2023 20:09 )  PTT:31.2 sec  12-15    135  |  99  |  7.2<L>  ----------------------------<  107<H>  4.2   |  23.0  |  0.34<L>    Ca    9.9      15 Dec 2023 20:09    TPro  6.6  /  Alb  4.2  /  TBili  13.2<H>  /  DBili  10.0<H>  /  AST  99<H>  /  ALT  121<H>  /  AlkPhos  247<H>  12-15    LIVER FUNCTIONS - ( 15 Dec 2023 20:09 )  Alb: 4.2 g/dL / Pro: 6.6 g/dL / ALK PHOS: 247 U/L / ALT: 121 U/L / AST: 99 U/L / GGT: x               Urinalysis Basic - ( 15 Dec 2023 20:09 )    Color: x / Appearance: x / SG: x / pH: x  Gluc: 107 mg/dL / Ketone: x  / Bili: x / Urobili: x   Blood: x / Protein: x / Nitrite: x   Leuk Esterase: x / RBC: x / WBC x   Sq Epi: x / Non Sq Epi: x / Bacteria: x    < from: CT Abdomen and Pelvis w/ IV Cont (12.07.23 @ 00:40) >  FINDINGS:    LOWER CHEST: Within normal limits.    LIVER: Within normal limits.  BILE DUCTS: Normal caliber.  GALLBLADDER: Within normal limits.  SPLEEN: Within normal limits.  PANCREAS: Within normal limits.  ADRENALS: Within normal limits.  KIDNEYS/URETERS: Enhance symmetrically. No hydronephrosis. A   subcentimeter hypodensity in the left kidney is too small to characterize.    BLADDER: Within normal limits.  REPRODUCTIVE ORGANS: Uterus and adnexa are grossly unremarkable.    BOWEL: No bowel obstruction. Appendix is unremarkable.  PERITONEUM: No ascites.  VESSELS: Normal caliber abdominal aorta.  RETROPERITONEUM/LYMPH NODES: No lymphadenopathy.  ABDOMINAL WALL: Within normal limits.  BONES: Mild degenerative changes in the spine.    IMPRESSION:    No acute CT findings in the abdomen or pelvis. No CT findings to explain   jaundice.    < end of copied text >

## 2023-12-16 NOTE — CONSULT NOTE ADULT - ASSESSMENT
58/F no PMHx presented to ED after referred by hepatologist Dr. Chacko for worsening bilirubinemia.   CT abd/ pel with no acute finding.  T Bili 13, AST/ /83, . Normal INR, no thrombocytopenia.    EBV negative + IgG with + capsid, early and nuclear antibody- reactivation of EBV.  Autoimmune- PBC, PSC and Autoimmune hepatitis work up negative  Acute hepatitis panel Non reactive. Acetaminophen, salicylate level WNL       Plan:  MR abd / MRCP ordered, pending  Trend liver chemistry, INR CBC, daily  Avoid NSAIDs  Avoid hepatotoxic agents  May need liver biopsy for definitive diagnosis   Can have Acetaminophen 2 Gm total in 24 hours if needed  Diet as tolerated   Please order US liver with doppler   Needs follow up with dr. Chacko as outpatient

## 2023-12-16 NOTE — CONSULT NOTE ADULT - TIME BILLING
Reviewing her previous work-up and imaging studies done through our office as well as her lab work here.

## 2023-12-16 NOTE — CONSULT NOTE ADULT - PROVIDER SPECIALTY LIST ADULT
3/8 dtr emailed back and reports her and her brothers are deciding. F/u with dtr [email or web address]   Gastroenterology

## 2023-12-16 NOTE — H&P ADULT - HISTORY OF PRESENT ILLNESS
58/F no PMHx was sent in by Hepatologist, Dr. Chacko for elevation in bilirubin for an MRCP and liver biopsy. Pt was recently admitted on 12/7  with painless jaundice which began around thanksgiving time, no definitive etiology for hepatitis identified thus far, CT A+P with no acute findings or masses. Pt reports having stopped all OTC herbal supplements since discharge and some improvement in fatigue. Reports that her stool has been darker, but still has dark urine and itching, No fever or chills, family history of liver disease, no alcohol/drug use, rash or joint pain.  58/F no PMHx was sent in by Hepatologist, Dr. Chacko for elevation in bilirubin for an MRCP and liver biopsy. Pt was recently admitted on 12/7  with painless jaundice which began around thanksgiving time, no definitive etiology for hepatitis identified thus far, CT A+P with no acute findings or masses. Pt reports having stopped all OTC herbal supplements since discharge and some improvement in fatigue. Reports worsening in itching and jaundice but her stool has been darker, but still has dark urine and itching, No fever or chills, family history of liver disease, no alcohol/drug use, rash or joint pain.

## 2023-12-16 NOTE — H&P ADULT - NSHPREVIEWOFSYSTEMS_GEN_ALL_CORE
REVIEW OF SYSTEMS  CONSTITUTIONAL: No weakness  EYES/ENT: No visual changes;  No throat pain   NECK: No pain or stiffness  RESPIRATORY: No cough, wheezing; No shortness of breath  CARDIOVASCULAR: No chest pain or palpitations  GASTROINTESTINAL: No abdominal  pain. No nausea, vomiting, or hematemesis  GENITOURINARY: No dysuria, frequency or hematuria  NEUROLOGICAL: No stroke like symptoms  SKIN:+ Yellowing of skin and +Itching

## 2023-12-16 NOTE — PROGRESS NOTE ADULT - SUBJECTIVE AND OBJECTIVE BOX
Patient is a 58y old  Female who presents with a chief complaint of Increase in bilirubin (16 Dec 2023 09:38)    ALLERGIES:  tetracycline (Hives)    MEDICATIONS  (STANDING):  influenza   Vaccine 0.5 milliLiter(s) IntraMuscular once  LORazepam     Tablet 0.5 milliGRAM(s) Oral once    MEDICATIONS  (PRN):  aluminum hydroxide/magnesium hydroxide/simethicone Suspension 30 milliLiter(s) Oral every 4 hours PRN Dyspepsia  hydrOXYzine hydrochloride 25 milliGRAM(s) Oral four times a day PRN Itching  melatonin 3 milliGRAM(s) Oral at bedtime PRN Insomnia  ondansetron Injectable 4 milliGRAM(s) IV Push every 8 hours PRN Nausea and/or Vomiting    Vital Signs Last 24 Hrs  T(F): 97.8 (16 Dec 2023 07:53), Max: 98.5 (15 Dec 2023 17:14)  HR: 71 (16 Dec 2023 07:53) (67 - 82)  BP: 113/67 (16 Dec 2023 07:53) (110/68 - 124/72)  RR: 18 (16 Dec 2023 07:53) (17 - 18)  SpO2: 99% (16 Dec 2023 07:53) (97% - 99%)  I&O's Summary      PHYSICAL EXAM:  General:   ENT:   Neck:   Lungs:   Cardio: RRR, S1/S2, No murmur  Abdomen: Soft, Nontender, Nondistended; Bowel sounds present  Extremities: No calf tenderness, No pitting edema    LABS:                        13.8   3.92  )-----------( 339      ( 16 Dec 2023 09:33 )             39.1     12-15    135  |  99  |  7.2  ----------------------------<  107  4.2   |  23.0  |  0.34    Ca    9.9      15 Dec 2023 20:09    TPro  6.6  /  Alb  4.2  /  TBili  13.2  /  DBili  10.0  /  AST  99  /  ALT  121  /  AlkPhos  247  12-15          PT/INR - ( 16 Dec 2023 09:33 )   PT: 9.6 sec;   INR: 0.86 ratio         PTT - ( 15 Dec 2023 20:09 )  PTT:31.2 sec                            Urinalysis Basic - ( 15 Dec 2023 20:09 )    Color: x / Appearance: x / SG: x / pH: x  Gluc: 107 mg/dL / Ketone: x  / Bili: x / Urobili: x   Blood: x / Protein: x / Nitrite: x   Leuk Esterase: x / RBC: x / WBC x   Sq Epi: x / Non Sq Epi: x / Bacteria: x          RADIOLOGY & ADDITIONAL TESTS:    < from: CT Abdomen and Pelvis w/ IV Cont (12.07.23 @ 00:40) >  MPRESSION:    No acute CT findings in the abdomen or pelvis. No CT findings to explain   jaundice.    --- End of Report ---          < end of copied text >     Patient is a 58y old  Female who presents with a chief complaint of Increase in bilirubin (16 Dec 2023 09:38)    pt is seen in am   she is anxious prior MR   denies abd pain     ALLERGIES:  tetracycline (Hives)    MEDICATIONS  (STANDING):  influenza   Vaccine 0.5 milliLiter(s) IntraMuscular once  LORazepam     Tablet 0.5 milliGRAM(s) Oral once    MEDICATIONS  (PRN):  aluminum hydroxide/magnesium hydroxide/simethicone Suspension 30 milliLiter(s) Oral every 4 hours PRN Dyspepsia  hydrOXYzine hydrochloride 25 milliGRAM(s) Oral four times a day PRN Itching  melatonin 3 milliGRAM(s) Oral at bedtime PRN Insomnia  ondansetron Injectable 4 milliGRAM(s) IV Push every 8 hours PRN Nausea and/or Vomiting    Vital Signs Last 24 Hrs  T(F): 97.8 (16 Dec 2023 07:53), Max: 98.5 (15 Dec 2023 17:14)  HR: 71 (16 Dec 2023 07:53) (67 - 82)  BP: 113/67 (16 Dec 2023 07:53) (110/68 - 124/72)  RR: 18 (16 Dec 2023 07:53) (17 - 18)  SpO2: 99% (16 Dec 2023 07:53) (97% - 99%)  I&O's Summary      PHYSICAL EXAM:  General: awake alert    Neck: supple ,no JVD   Lungs: CTA bilateral   Cardio: RRR, S1/S2, No murmur  Abdomen: Soft, Nontender, Nondistended; Bowel sounds present  Extremities: No calf tenderness, No pitting edema    LABS:                        13.8   3.92  )-----------( 339      ( 16 Dec 2023 09:33 )             39.1     12-15    135  |  99  |  7.2  ----------------------------<  107  4.2   |  23.0  |  0.34    Ca    9.9      15 Dec 2023 20:09    TPro  6.6  /  Alb  4.2  /  TBili  13.2  /  DBili  10.0  /  AST  99  /  ALT  121  /  AlkPhos  247  12-15          PT/INR - ( 16 Dec 2023 09:33 )   PT: 9.6 sec;   INR: 0.86 ratio         PTT - ( 15 Dec 2023 20:09 )  PTT:31.2 sec                            Urinalysis Basic - ( 15 Dec 2023 20:09 )    Color: x / Appearance: x / SG: x / pH: x  Gluc: 107 mg/dL / Ketone: x  / Bili: x / Urobili: x   Blood: x / Protein: x / Nitrite: x   Leuk Esterase: x / RBC: x / WBC x   Sq Epi: x / Non Sq Epi: x / Bacteria: x          RADIOLOGY & ADDITIONAL TESTS:    < from: CT Abdomen and Pelvis w/ IV Cont (12.07.23 @ 00:40) >  MPRESSION:    No acute CT findings in the abdomen or pelvis. No CT findings to explain   jaundice.    --- End of Report ---          < end of copied text >

## 2023-12-16 NOTE — PROGRESS NOTE ADULT - ASSESSMENT
58/F no PMHx was sent in by Hepatologist, Dr. Chacko for elevation in bilirubin for an MRCP and liver biopsy. No definitive etiology for jaundice or hepatitis.    Painless jaundice with transaminitis   -  VTE prophylaxis: Held as low risk and biopsy planned  - Regular diet, put on NPO for procedure 58/F no PMHx was sent in by Hepatologist, Dr. Chacko for elevation in bilirubin for an MRCP and liver biopsy. No definitive etiology for jaundice or hepatitis.    1-Painless jaundice with transaminitis   MR of abd pending   GI on board   further rec pending MR by GI

## 2023-12-16 NOTE — CONSULT NOTE ADULT - NS ATTEND AMEND GEN_ALL_CORE FT
I evaluated and examined this pt. with my ACP in order to formulate the above assessment and management plan. Awaiting MRI/ MRCP. Pt. needs pre-MRI sedation with benzodiazepines. If MRI negative for hepatobiliary pathology pt. will need IR directed liver biopsy. Trend LFT's while here. Pt. sent in by our hepatologist, Dr. Chacko for worsening LFT's.

## 2023-12-16 NOTE — H&P ADULT - NSCORESITESY/N_GEN_A_CORE_RD
Caller: Hemant Brown    Relationship: Self    Best call back number: [phone]     Requested Prescriptions:   Requested Prescriptions     Pending Prescriptions Disp Refills   • lisdexamfetamine (Vyvanse) 50 MG capsule 30 capsule 0     Sig: Take 1 capsule by mouth Every Morning        Pharmacy where request should be sent: [address] - [phone]  - [phone] FX     Additional details provided by patient: THE PATIENT STATES THAT HE HAS ONE CAPSULE LEFT.  THE PATIENT STATES THAT HE WOULD PREFER A 3 MONTH SUPPLY, IF POSSIBLE.    Does the patient have less than a 3 day supply:  [x] Yes  [] No    Nanette FRASER   07/05/22 16:22 CDT            No

## 2023-12-17 DIAGNOSIS — E80.6 OTHER DISORDERS OF BILIRUBIN METABOLISM: ICD-10-CM

## 2023-12-17 LAB
ALBUMIN SERPL ELPH-MCNC: 3.6 G/DL — SIGNIFICANT CHANGE UP (ref 3.3–5.2)
ALBUMIN SERPL ELPH-MCNC: 3.6 G/DL — SIGNIFICANT CHANGE UP (ref 3.3–5.2)
ALP SERPL-CCNC: 202 U/L — HIGH (ref 40–120)
ALP SERPL-CCNC: 202 U/L — HIGH (ref 40–120)
ALT FLD-CCNC: 92 U/L — HIGH
ALT FLD-CCNC: 92 U/L — HIGH
AST SERPL-CCNC: 83 U/L — HIGH
AST SERPL-CCNC: 83 U/L — HIGH
BILIRUB DIRECT SERPL-MCNC: 8.7 MG/DL — HIGH (ref 0–0.3)
BILIRUB DIRECT SERPL-MCNC: 8.7 MG/DL — HIGH (ref 0–0.3)
BILIRUB INDIRECT FLD-MCNC: 3 MG/DL — HIGH (ref 0.2–1)
BILIRUB INDIRECT FLD-MCNC: 3 MG/DL — HIGH (ref 0.2–1)
BILIRUB SERPL-MCNC: 11.7 MG/DL — HIGH (ref 0.4–2)
BILIRUB SERPL-MCNC: 11.7 MG/DL — HIGH (ref 0.4–2)
MAGNESIUM SERPL-MCNC: 2.2 MG/DL — SIGNIFICANT CHANGE UP (ref 1.8–2.6)
MAGNESIUM SERPL-MCNC: 2.2 MG/DL — SIGNIFICANT CHANGE UP (ref 1.8–2.6)
PHOSPHATE SERPL-MCNC: 3.9 MG/DL — SIGNIFICANT CHANGE UP (ref 2.4–4.7)
PHOSPHATE SERPL-MCNC: 3.9 MG/DL — SIGNIFICANT CHANGE UP (ref 2.4–4.7)
PROT SERPL-MCNC: 5.8 G/DL — LOW (ref 6.6–8.7)
PROT SERPL-MCNC: 5.8 G/DL — LOW (ref 6.6–8.7)

## 2023-12-17 PROCEDURE — 99233 SBSQ HOSP IP/OBS HIGH 50: CPT

## 2023-12-17 PROCEDURE — 93975 VASCULAR STUDY: CPT | Mod: 26

## 2023-12-17 RX ADMIN — NYSTATIN CREAM 1 APPLICATION(S): 100000 CREAM TOPICAL at 05:43

## 2023-12-17 NOTE — PROGRESS NOTE ADULT - SUBJECTIVE AND OBJECTIVE BOX
Pt seen and examined for chief complaint of hyperbilirubunemia.    Pt. seen this AM. No GI complaints overnight. She had MRI / MRCP last night which  was completely negative with a normal appareling liver and bile ducts with no evidence of choledocholithiasis. Her LFT's today are slightly improved. No c/o abdominal pain, N/V    REVIEW OF SYSTEMS:  Constitutional: No fever, weight loss or fatigue  Cardiovascular: No chest pain, palpitations, dizziness or leg swelling  Gastrointestinal: No abdominal or epigastric pain. No nausea, vomiting or hematemesis; No diarrhea or constipation. No melena or hematochezia.  Skin: No itching, burning, rashes or lesions   Remainder of 14 point ROS: Negative.      MEDICATIONS:  MEDICATIONS  (STANDING):  influenza   Vaccine 0.5 milliLiter(s) IntraMuscular once  nystatin Powder 1 Application(s) Topical two times a day    MEDICATIONS  (PRN):  aluminum hydroxide/magnesium hydroxide/simethicone Suspension 30 milliLiter(s) Oral every 4 hours PRN Dyspepsia  hydrOXYzine hydrochloride 25 milliGRAM(s) Oral four times a day PRN Itching  melatonin 3 milliGRAM(s) Oral at bedtime PRN Insomnia  ondansetron Injectable 4 milliGRAM(s) IV Push every 8 hours PRN Nausea and/or Vomiting      Allergies    tetracycline (Hives)    Intolerances        Vital Signs Last 24 Hrs  T(C): 36.7 (17 Dec 2023 06:41), Max: 36.7 (16 Dec 2023 11:11)  T(F): 98 (17 Dec 2023 06:41), Max: 98 (16 Dec 2023 11:11)  HR: 74 (17 Dec 2023 06:41) (58 - 98)  BP: 101/66 (17 Dec 2023 06:41) (101/66 - 128/71)  BP(mean): 77 (17 Dec 2023 06:41) (77 - 95)  RR: 18 (17 Dec 2023 06:41) (18 - 18)  SpO2: 96% (17 Dec 2023 06:41) (95% - 99%)    Parameters below as of 17 Dec 2023 06:41  Patient On (Oxygen Delivery Method): room air          PHYSICAL EXAM:    Constitutional: No acute distress  Neuro: Awake alert, oriented  HEENT: anicteric sclerae  CV: regular rate, regular rhythm  Pulm/chest: lung sounds clear/diminished bilaterally, no accessory muscle use noted  Abd: soft, nontender, nondistended +bowel sounds. No rigidity, rebound tenderness, or guarding  Ext: no edema  Skin: warm, no jaundice   Psych: calm, cooperative    LABS:  CBC Full  -  ( 16 Dec 2023 09:33 )  WBC Count : 3.92 K/uL  RBC Count : 4.71 M/uL  Hemoglobin : 13.8 g/dL  Hematocrit : 39.1 %  Platelet Count - Automated : 339 K/uL  Mean Cell Volume : 83.0 fl  Mean Cell Hemoglobin : 29.3 pg  Mean Cell Hemoglobin Concentration : 35.3 gm/dL  Auto Neutrophil # : 1.92 K/uL  Auto Lymphocyte # : 1.15 K/uL  Auto Monocyte # : 0.53 K/uL  Auto Eosinophil # : 0.25 K/uL  Auto Basophil # : 0.05 K/uL  Auto Neutrophil % : 49.0 %  Auto Lymphocyte % : 29.3 %  Auto Monocyte % : 13.5 %  Auto Eosinophil % : 6.4 %  Auto Basophil % : 1.3 %    12-16    136  |  98  |  6.6<L>  ----------------------------<  95  3.8   |  24.0  |  0.32<L>    Ca    10.0      16 Dec 2023 09:33  Phos  3.9     12-17  Mg     2.2     12-17    TPro  5.8<L>  /  Alb  3.6  /  TBili  11.7<H>  /  DBili  8.7<H>  /  AST  83<H>  /  ALT  92<H>  /  AlkPhos  202<H>  12-17    PT/INR - ( 16 Dec 2023 09:33 )   PT: 9.6 sec;   INR: 0.86 ratio         PTT - ( 15 Dec 2023 20:09 )  PTT:31.2 sec      Urinalysis Basic - ( 16 Dec 2023 09:33 )    Color: x / Appearance: x / SG: x / pH: x  Gluc: 95 mg/dL / Ketone: x  / Bili: x / Urobili: x   Blood: x / Protein: x / Nitrite: x   Leuk Esterase: x / RBC: x / WBC x   Sq Epi: x / Non Sq Epi: x / Bacteria: x                RADIOLOGY & ADDITIONAL STUDIES (The following images were personally reviewed):< from: MR MRCP No Cont (12.16.23 @ 22:18) >  ACC: [id number] EXAM:  MR MRCP   ORDERED BY: YAIMA SANDOVAL     PROCEDURE DATE:  12/16/2023          INTERPRETATION:  CLINICAL INFORMATION: Jaundice. Worsening bilirubin.    COMPARISON: CT abdomen pelvis December 7, 2023.    CONTRAST/COMPLICATIONS:  IV Contrast: NONE  Oral Contrast: NONE  Complications: None reported at time of study completion    PROCEDURE:  MRI of the abdomen was performed.  MRCP was performed.    FINDINGS:  LOWER CHEST: Within normal limits.    LIVER: Within normal limits.  BILE DUCTS: Normal caliber. Normal course and contour. No filling   defects/choledocholithiasis.  GALLBLADDER: Within normal limits. No gallstones.  SPLEEN: Within normal limits.  PANCREAS: Within normal limits.  ADRENALS: Within normal limits.  KIDNEYS/URETERS: Within normal limits.    VISUALIZED PORTIONS:  BOWEL: No bowel obstruction.  PERITONEUM: No ascites.  VESSELS: Within normal limits.  RETROPERITONEUM/LYMPH NODES: No lymphadenopathy.  ABDOMINAL WALL: Within normal limits.  BONES: Within normal limits.    IMPRESSION:  Unremarkable MRI/MRCP.        --- End of Report ---            JH MURILLO MD; Attending Radiologist  This document has been electronically signed. Dec 17 2023  8:49AM    < end of copied text >   Pt seen and examined for chief complaint of hyperbilirubunemia.    Pt. seen this AM. No GI complaints overnight. She had MRI / MRCP last night which  was completely negative with a normal appareling liver and bile ducts with no evidence of choledocholithiasis. Her LFT's today are slightly improved. No c/o abdominal pain, N/V    REVIEW OF SYSTEMS:  Constitutional: No fever, weight loss or fatigue  Cardiovascular: No chest pain, palpitations, dizziness or leg swelling  Gastrointestinal: No abdominal or epigastric pain. No nausea, vomiting or hematemesis; No diarrhea or constipation. No melena or hematochezia.  Skin: No itching, burning, rashes or lesions   Remainder of 14 point ROS: Negative.      MEDICATIONS:  MEDICATIONS  (STANDING):  influenza   Vaccine 0.5 milliLiter(s) IntraMuscular once  nystatin Powder 1 Application(s) Topical two times a day    MEDICATIONS  (PRN):  aluminum hydroxide/magnesium hydroxide/simethicone Suspension 30 milliLiter(s) Oral every 4 hours PRN Dyspepsia  hydrOXYzine hydrochloride 25 milliGRAM(s) Oral four times a day PRN Itching  melatonin 3 milliGRAM(s) Oral at bedtime PRN Insomnia  ondansetron Injectable 4 milliGRAM(s) IV Push every 8 hours PRN Nausea and/or Vomiting      Allergies    tetracycline (Hives)    Intolerances        Vital Signs Last 24 Hrs  T(C): 36.7 (17 Dec 2023 06:41), Max: 36.7 (16 Dec 2023 11:11)  T(F): 98 (17 Dec 2023 06:41), Max: 98 (16 Dec 2023 11:11)  HR: 74 (17 Dec 2023 06:41) (58 - 98)  BP: 101/66 (17 Dec 2023 06:41) (101/66 - 128/71)  BP(mean): 77 (17 Dec 2023 06:41) (77 - 95)  RR: 18 (17 Dec 2023 06:41) (18 - 18)  SpO2: 96% (17 Dec 2023 06:41) (95% - 99%)    Parameters below as of 17 Dec 2023 06:41  Patient On (Oxygen Delivery Method): room air          PHYSICAL EXAM:    Constitutional: No acute distress  Neuro: Awake alert, oriented  HEENT: anicteric sclerae  CV: regular rate, regular rhythm  Pulm/chest: lung sounds clear/diminished bilaterally, no accessory muscle use noted  Abd: soft, nontender, nondistended +bowel sounds. No rigidity, rebound tenderness, or guarding  Ext: no edema  Skin: warm, no jaundice   Psych: calm, cooperative    LABS:  CBC Full  -  ( 16 Dec 2023 09:33 )  WBC Count : 3.92 K/uL  RBC Count : 4.71 M/uL  Hemoglobin : 13.8 g/dL  Hematocrit : 39.1 %  Platelet Count - Automated : 339 K/uL  Mean Cell Volume : 83.0 fl  Mean Cell Hemoglobin : 29.3 pg  Mean Cell Hemoglobin Concentration : 35.3 gm/dL  Auto Neutrophil # : 1.92 K/uL  Auto Lymphocyte # : 1.15 K/uL  Auto Monocyte # : 0.53 K/uL  Auto Eosinophil # : 0.25 K/uL  Auto Basophil # : 0.05 K/uL  Auto Neutrophil % : 49.0 %  Auto Lymphocyte % : 29.3 %  Auto Monocyte % : 13.5 %  Auto Eosinophil % : 6.4 %  Auto Basophil % : 1.3 %    12-16    136  |  98  |  6.6<L>  ----------------------------<  95  3.8   |  24.0  |  0.32<L>    Ca    10.0      16 Dec 2023 09:33  Phos  3.9     12-17  Mg     2.2     12-17    TPro  5.8<L>  /  Alb  3.6  /  TBili  11.7<H>  /  DBili  8.7<H>  /  AST  83<H>  /  ALT  92<H>  /  AlkPhos  202<H>  12-17    PT/INR - ( 16 Dec 2023 09:33 )   PT: 9.6 sec;   INR: 0.86 ratio         PTT - ( 15 Dec 2023 20:09 )  PTT:31.2 sec      Urinalysis Basic - ( 16 Dec 2023 09:33 )    Color: x / Appearance: x / SG: x / pH: x  Gluc: 95 mg/dL / Ketone: x  / Bili: x / Urobili: x   Blood: x / Protein: x / Nitrite: x   Leuk Esterase: x / RBC: x / WBC x   Sq Epi: x / Non Sq Epi: x / Bacteria: x                RADIOLOGY & ADDITIONAL STUDIES (The following images were personally reviewed):< from: MR MRCP No Cont (12.16.23 @ 22:18) >  ACC: [id number] EXAM:  MR MRCP   ORDERED BY: YAIMA SANDOVAL     PROCEDURE DATE:  12/16/2023          INTERPRETATION:  CLINICAL INFORMATION: Jaundice. Worsening bilirubin.    COMPARISON: CT abdomen pelvis December 7, 2023.    CONTRAST/COMPLICATIONS:  IV Contrast: NONE  Oral Contrast: NONE  Complications: None reported at time of study completion    PROCEDURE:  MRI of the abdomen was performed.  MRCP was performed.    FINDINGS:  LOWER CHEST: Within normal limits.    LIVER: Within normal limits.  BILE DUCTS: Normal caliber. Normal course and contour. No filling   defects/choledocholithiasis.  GALLBLADDER: Within normal limits. No gallstones.  SPLEEN: Within normal limits.  PANCREAS: Within normal limits.  ADRENALS: Within normal limits.  KIDNEYS/URETERS: Within normal limits.    VISUALIZED PORTIONS:  BOWEL: No bowel obstruction.  PERITONEUM: No ascites.  VESSELS: Within normal limits.  RETROPERITONEUM/LYMPH NODES: No lymphadenopathy.  ABDOMINAL WALL: Within normal limits.  BONES: Within normal limits.    IMPRESSION:  Unremarkable MRI/MRCP.        --- End of Report ---            JH MURILLO MD; Attending Radiologist  This document has been electronically signed. Dec 17 2023  8:49AM    < end of copied text >

## 2023-12-17 NOTE — DIETITIAN INITIAL EVALUATION ADULT - PERTINENT LABORATORY DATA
12-16    136  |  98  |  6.6<L>  ----------------------------<  95  3.8   |  24.0  |  0.32<L>    Ca    10.0      16 Dec 2023 09:33  Phos  3.9     12-17  Mg     2.2     12-17    TPro  5.8<L>  /  Alb  3.6  /  TBili  11.7<H>  /  DBili  8.7<H>  /  AST  83<H>  /  ALT  92<H>  /  AlkPhos  202<H>  12-17

## 2023-12-17 NOTE — PROGRESS NOTE ADULT - SUBJECTIVE AND OBJECTIVE BOX
INTERVAL HPI/OVERNIGHT EVENTS:    CC: hyperbilirubinemia    Chart and course reviewed.  feels ok, occasional itching  discontinued herbal supplements recently after she was told about her LFTs  only home med is aspirin.    Vital Signs Last 24 Hrs  T(C): 36.7 (17 Dec 2023 06:41), Max: 36.7 (16 Dec 2023 11:11)  T(F): 98 (17 Dec 2023 06:41), Max: 98 (16 Dec 2023 11:11)  HR: 74 (17 Dec 2023 06:41) (58 - 98)  BP: 101/66 (17 Dec 2023 06:41) (101/66 - 128/71)  BP(mean): 77 (17 Dec 2023 06:41) (77 - 95)  RR: 18 (17 Dec 2023 06:41) (18 - 18)  SpO2: 96% (17 Dec 2023 06:41) (95% - 99%)    Parameters below as of 17 Dec 2023 06:41  Patient On (Oxygen Delivery Method): room air        PHYSICAL EXAM:    GENERAL: alert, not in distress, icterus present  CHEST/LUNG: b/l air entry  HEART: reg  ABDOMEN: soft, bs+, non tender  EXTREMITIES:  no edema, tenderness    MEDICATIONS  (STANDING):  influenza   Vaccine 0.5 milliLiter(s) IntraMuscular once  nystatin Powder 1 Application(s) Topical two times a day    MEDICATIONS  (PRN):  aluminum hydroxide/magnesium hydroxide/simethicone Suspension 30 milliLiter(s) Oral every 4 hours PRN Dyspepsia  hydrOXYzine hydrochloride 25 milliGRAM(s) Oral four times a day PRN Itching  melatonin 3 milliGRAM(s) Oral at bedtime PRN Insomnia  ondansetron Injectable 4 milliGRAM(s) IV Push every 8 hours PRN Nausea and/or Vomiting      Allergies    tetracycline (Hives)    Intolerances          LABS:                          13.8   3.92  )-----------( 339      ( 16 Dec 2023 09:33 )             39.1     12-16    136  |  98  |  6.6<L>  ----------------------------<  95  3.8   |  24.0  |  0.32<L>    Ca    10.0      16 Dec 2023 09:33  Phos  3.9     12-17  Mg     2.2     12-17    TPro  5.8<L>  /  Alb  3.6  /  TBili  11.7<H>  /  DBili  8.7<H>  /  AST  83<H>  /  ALT  92<H>  /  AlkPhos  202<H>  12-17    PT/INR - ( 16 Dec 2023 09:33 )   PT: 9.6 sec;   INR: 0.86 ratio         PTT - ( 15 Dec 2023 20:09 )  PTT:31.2 sec  Urinalysis Basic - ( 16 Dec 2023 09:33 )    Color: x / Appearance: x / SG: x / pH: x  Gluc: 95 mg/dL / Ketone: x  / Bili: x / Urobili: x   Blood: x / Protein: x / Nitrite: x   Leuk Esterase: x / RBC: x / WBC x   Sq Epi: x / Non Sq Epi: x / Bacteria: x        RADIOLOGY & ADDITIONAL TESTS:

## 2023-12-17 NOTE — DIETITIAN INITIAL EVALUATION ADULT - PERTINENT MEDS FT
MEDICATIONS  (STANDING):  influenza   Vaccine 0.5 milliLiter(s) IntraMuscular once  nystatin Powder 1 Application(s) Topical two times a day    MEDICATIONS  (PRN):  aluminum hydroxide/magnesium hydroxide/simethicone Suspension 30 milliLiter(s) Oral every 4 hours PRN Dyspepsia  hydrOXYzine hydrochloride 25 milliGRAM(s) Oral four times a day PRN Itching  melatonin 3 milliGRAM(s) Oral at bedtime PRN Insomnia  ondansetron Injectable 4 milliGRAM(s) IV Push every 8 hours PRN Nausea and/or Vomiting

## 2023-12-17 NOTE — DIETITIAN INITIAL EVALUATION ADULT - ORAL INTAKE PTA/DIET HISTORY
Pt reports decreased po intake and 8# weight loss in last few weeks. No GI issues noted. Pt awaiting her breakfast tray this am and is hoping she can eat.  Pt reports decreased po intake and 8# weight loss in last few weeks. Pt does report taking herbs and now stopped.  No GI issues noted. Pt awaiting her breakfast tray this am and is hoping she has an appetite.

## 2023-12-17 NOTE — PROGRESS NOTE ADULT - TIME BILLING
Reviewing MRI images and lab work. Reviewing MRI images and lab work and discussing risks vs. benefits of liver biopsy and MRCP results with the pt.

## 2023-12-17 NOTE — DIETITIAN NUTRITION RISK NOTIFICATION - TREATMENT: THE FOLLOWING DIET HAS BEEN RECOMMENDED
Diet, NPO after Midnight:      NPO Start Date: 17-Dec-2023,   NPO Start Time: 23:59  Except Medications (12-17-23 @ 11:02) [Active]  Diet, Regular (12-16-23 @ 00:29) [Active]

## 2023-12-17 NOTE — DIETITIAN INITIAL EVALUATION ADULT - OTHER INFO
58/F no PMHx presented to ED after referred by hepatologist Dr. Chacko for worsening bilirubinemia.   CT abd/ pel with no acute finding.  MR abd / MRCP ordered, pending.

## 2023-12-17 NOTE — PROGRESS NOTE ADULT - ASSESSMENT
58 yr old female with no known medical history, recently admitted for transaminitis was sent in for evaluation by her hepatologist for MRCP and liver biopsy. Noted to have recent worsening bilirubin. Work up done recently revealed early EBV Ag positive. GI evaluation was requested. MRCP was ordered.     1. Hyperbilirubinemia:  MRCP with no  GI following  Monitor LFTs.  plan for liver biopsy per   58 yr old female with no known medical history, recently admitted for transaminitis was sent in for evaluation by her hepatologist for MRCP and liver biopsy. Noted to have recent worsening bilirubin. Work up done recently revealed early EBV Ag positive. GI evaluation was requested. MRCP was ordered.     1. Hyperbilirubinemia:  MRCP with no obstruction.  GI following  Monitor LFTs.  plan for liver biopsy by IR, in am.    2. DVT ppx:  SCDs    Discussed with patient and GI.

## 2023-12-18 ENCOUNTER — TRANSCRIPTION ENCOUNTER (OUTPATIENT)
Age: 58
End: 2023-12-18

## 2023-12-18 ENCOUNTER — RESULT REVIEW (OUTPATIENT)
Age: 58
End: 2023-12-18

## 2023-12-18 DIAGNOSIS — R74.8 ABNORMAL LEVELS OF OTHER SERUM ENZYMES: ICD-10-CM

## 2023-12-18 LAB
ALBUMIN SERPL ELPH-MCNC: 3.7 G/DL — SIGNIFICANT CHANGE UP (ref 3.3–5.2)
ALBUMIN SERPL ELPH-MCNC: 3.7 G/DL — SIGNIFICANT CHANGE UP (ref 3.3–5.2)
ALP SERPL-CCNC: 212 U/L — HIGH (ref 40–120)
ALP SERPL-CCNC: 212 U/L — HIGH (ref 40–120)
ALT FLD-CCNC: 79 U/L — HIGH
ALT FLD-CCNC: 79 U/L — HIGH
ANION GAP SERPL CALC-SCNC: 14 MMOL/L — SIGNIFICANT CHANGE UP (ref 5–17)
ANION GAP SERPL CALC-SCNC: 14 MMOL/L — SIGNIFICANT CHANGE UP (ref 5–17)
AST SERPL-CCNC: 66 U/L — HIGH
AST SERPL-CCNC: 66 U/L — HIGH
BASOPHILS # BLD AUTO: 0.07 K/UL — SIGNIFICANT CHANGE UP (ref 0–0.2)
BASOPHILS # BLD AUTO: 0.07 K/UL — SIGNIFICANT CHANGE UP (ref 0–0.2)
BASOPHILS NFR BLD AUTO: 1.7 % — SIGNIFICANT CHANGE UP (ref 0–2)
BASOPHILS NFR BLD AUTO: 1.7 % — SIGNIFICANT CHANGE UP (ref 0–2)
BILIRUB SERPL-MCNC: 10.2 MG/DL — HIGH (ref 0.4–2)
BILIRUB SERPL-MCNC: 10.2 MG/DL — HIGH (ref 0.4–2)
BUN SERPL-MCNC: 7.8 MG/DL — LOW (ref 8–20)
BUN SERPL-MCNC: 7.8 MG/DL — LOW (ref 8–20)
CALCIUM SERPL-MCNC: 9.1 MG/DL — SIGNIFICANT CHANGE UP (ref 8.4–10.5)
CALCIUM SERPL-MCNC: 9.1 MG/DL — SIGNIFICANT CHANGE UP (ref 8.4–10.5)
CHLORIDE SERPL-SCNC: 104 MMOL/L — SIGNIFICANT CHANGE UP (ref 96–108)
CHLORIDE SERPL-SCNC: 104 MMOL/L — SIGNIFICANT CHANGE UP (ref 96–108)
CHOLEST SERPL-MCNC: 202 MG/DL — HIGH
CHOLEST SERPL-MCNC: 202 MG/DL — HIGH
CO2 SERPL-SCNC: 20 MMOL/L — LOW (ref 22–29)
CO2 SERPL-SCNC: 20 MMOL/L — LOW (ref 22–29)
CREAT SERPL-MCNC: 0.52 MG/DL — SIGNIFICANT CHANGE UP (ref 0.5–1.3)
CREAT SERPL-MCNC: 0.52 MG/DL — SIGNIFICANT CHANGE UP (ref 0.5–1.3)
EGFR: 108 ML/MIN/1.73M2 — SIGNIFICANT CHANGE UP
EGFR: 108 ML/MIN/1.73M2 — SIGNIFICANT CHANGE UP
EOSINOPHIL # BLD AUTO: 0.43 K/UL — SIGNIFICANT CHANGE UP (ref 0–0.5)
EOSINOPHIL # BLD AUTO: 0.43 K/UL — SIGNIFICANT CHANGE UP (ref 0–0.5)
EOSINOPHIL NFR BLD AUTO: 10.2 % — HIGH (ref 0–6)
EOSINOPHIL NFR BLD AUTO: 10.2 % — HIGH (ref 0–6)
GLUCOSE SERPL-MCNC: 96 MG/DL — SIGNIFICANT CHANGE UP (ref 70–99)
GLUCOSE SERPL-MCNC: 96 MG/DL — SIGNIFICANT CHANGE UP (ref 70–99)
HCT VFR BLD CALC: 34.6 % — SIGNIFICANT CHANGE UP (ref 34.5–45)
HCT VFR BLD CALC: 34.6 % — SIGNIFICANT CHANGE UP (ref 34.5–45)
HDLC SERPL-MCNC: 9 MG/DL — LOW
HDLC SERPL-MCNC: 9 MG/DL — LOW
HGB BLD-MCNC: 12.2 G/DL — SIGNIFICANT CHANGE UP (ref 11.5–15.5)
HGB BLD-MCNC: 12.2 G/DL — SIGNIFICANT CHANGE UP (ref 11.5–15.5)
IMM GRANULOCYTES NFR BLD AUTO: 0.2 % — SIGNIFICANT CHANGE UP (ref 0–0.9)
IMM GRANULOCYTES NFR BLD AUTO: 0.2 % — SIGNIFICANT CHANGE UP (ref 0–0.9)
INR BLD: 0.88 RATIO — SIGNIFICANT CHANGE UP (ref 0.85–1.18)
INR BLD: 0.88 RATIO — SIGNIFICANT CHANGE UP (ref 0.85–1.18)
IRON SATN MFR SERPL: 123 UG/DL — SIGNIFICANT CHANGE UP (ref 37–145)
IRON SATN MFR SERPL: 123 UG/DL — SIGNIFICANT CHANGE UP (ref 37–145)
LIPID PNL WITH DIRECT LDL SERPL: SIGNIFICANT CHANGE UP MG/DL
LIPID PNL WITH DIRECT LDL SERPL: SIGNIFICANT CHANGE UP MG/DL
LYMPHOCYTES # BLD AUTO: 1.47 K/UL — SIGNIFICANT CHANGE UP (ref 1–3.3)
LYMPHOCYTES # BLD AUTO: 1.47 K/UL — SIGNIFICANT CHANGE UP (ref 1–3.3)
LYMPHOCYTES # BLD AUTO: 34.8 % — SIGNIFICANT CHANGE UP (ref 13–44)
LYMPHOCYTES # BLD AUTO: 34.8 % — SIGNIFICANT CHANGE UP (ref 13–44)
MCHC RBC-ENTMCNC: 28.8 PG — SIGNIFICANT CHANGE UP (ref 27–34)
MCHC RBC-ENTMCNC: 28.8 PG — SIGNIFICANT CHANGE UP (ref 27–34)
MCHC RBC-ENTMCNC: 35.3 GM/DL — SIGNIFICANT CHANGE UP (ref 32–36)
MCHC RBC-ENTMCNC: 35.3 GM/DL — SIGNIFICANT CHANGE UP (ref 32–36)
MCV RBC AUTO: 81.8 FL — SIGNIFICANT CHANGE UP (ref 80–100)
MCV RBC AUTO: 81.8 FL — SIGNIFICANT CHANGE UP (ref 80–100)
MONOCYTES # BLD AUTO: 0.56 K/UL — SIGNIFICANT CHANGE UP (ref 0–0.9)
MONOCYTES # BLD AUTO: 0.56 K/UL — SIGNIFICANT CHANGE UP (ref 0–0.9)
MONOCYTES NFR BLD AUTO: 13.2 % — SIGNIFICANT CHANGE UP (ref 2–14)
MONOCYTES NFR BLD AUTO: 13.2 % — SIGNIFICANT CHANGE UP (ref 2–14)
NEUTROPHILS # BLD AUTO: 1.69 K/UL — LOW (ref 1.8–7.4)
NEUTROPHILS # BLD AUTO: 1.69 K/UL — LOW (ref 1.8–7.4)
NEUTROPHILS NFR BLD AUTO: 39.9 % — LOW (ref 43–77)
NEUTROPHILS NFR BLD AUTO: 39.9 % — LOW (ref 43–77)
NON HDL CHOLESTEROL: 193 MG/DL — HIGH
NON HDL CHOLESTEROL: 193 MG/DL — HIGH
PLATELET # BLD AUTO: 322 K/UL — SIGNIFICANT CHANGE UP (ref 150–400)
PLATELET # BLD AUTO: 322 K/UL — SIGNIFICANT CHANGE UP (ref 150–400)
POTASSIUM SERPL-MCNC: 3.8 MMOL/L — SIGNIFICANT CHANGE UP (ref 3.5–5.3)
POTASSIUM SERPL-MCNC: 3.8 MMOL/L — SIGNIFICANT CHANGE UP (ref 3.5–5.3)
POTASSIUM SERPL-SCNC: 3.8 MMOL/L — SIGNIFICANT CHANGE UP (ref 3.5–5.3)
POTASSIUM SERPL-SCNC: 3.8 MMOL/L — SIGNIFICANT CHANGE UP (ref 3.5–5.3)
PROT SERPL-MCNC: 6.1 G/DL — LOW (ref 6.6–8.7)
PROT SERPL-MCNC: 6.1 G/DL — LOW (ref 6.6–8.7)
PROTHROM AB SERPL-ACNC: 9.8 SEC — SIGNIFICANT CHANGE UP (ref 9.5–13)
PROTHROM AB SERPL-ACNC: 9.8 SEC — SIGNIFICANT CHANGE UP (ref 9.5–13)
RBC # BLD: 4.23 M/UL — SIGNIFICANT CHANGE UP (ref 3.8–5.2)
RBC # BLD: 4.23 M/UL — SIGNIFICANT CHANGE UP (ref 3.8–5.2)
RBC # FLD: 15.6 % — HIGH (ref 10.3–14.5)
RBC # FLD: 15.6 % — HIGH (ref 10.3–14.5)
SODIUM SERPL-SCNC: 138 MMOL/L — SIGNIFICANT CHANGE UP (ref 135–145)
SODIUM SERPL-SCNC: 138 MMOL/L — SIGNIFICANT CHANGE UP (ref 135–145)
TRIGL SERPL-MCNC: 407 MG/DL — HIGH
TRIGL SERPL-MCNC: 407 MG/DL — HIGH
WBC # BLD: 4.23 K/UL — SIGNIFICANT CHANGE UP (ref 3.8–10.5)
WBC # BLD: 4.23 K/UL — SIGNIFICANT CHANGE UP (ref 3.8–10.5)
WBC # FLD AUTO: 4.23 K/UL — SIGNIFICANT CHANGE UP (ref 3.8–10.5)
WBC # FLD AUTO: 4.23 K/UL — SIGNIFICANT CHANGE UP (ref 3.8–10.5)

## 2023-12-18 PROCEDURE — 99233 SBSQ HOSP IP/OBS HIGH 50: CPT

## 2023-12-18 PROCEDURE — 88341 IMHCHEM/IMCYTCHM EA ADD ANTB: CPT | Mod: 26

## 2023-12-18 PROCEDURE — 88313 SPECIAL STAINS GROUP 2: CPT | Mod: 26

## 2023-12-18 PROCEDURE — 88365 INSITU HYBRIDIZATION (FISH): CPT | Mod: 26

## 2023-12-18 PROCEDURE — 88312 SPECIAL STAINS GROUP 1: CPT | Mod: 26

## 2023-12-18 PROCEDURE — 88342 IMHCHEM/IMCYTCHM 1ST ANTB: CPT | Mod: 26

## 2023-12-18 PROCEDURE — 88307 TISSUE EXAM BY PATHOLOGIST: CPT | Mod: 26

## 2023-12-18 PROCEDURE — 47000 NEEDLE BIOPSY OF LIVER PERQ: CPT

## 2023-12-18 PROCEDURE — 76942 ECHO GUIDE FOR BIOPSY: CPT | Mod: 26

## 2023-12-18 RX ADMIN — Medication 25 MILLIGRAM(S): at 23:24

## 2023-12-18 RX ADMIN — Medication 25 MILLIGRAM(S): at 00:05

## 2023-12-18 NOTE — DISCHARGE NOTE PROVIDER - PROVIDER TOKENS
PROVIDER:[TOKEN:[[id number]],FOLLOWUP:[1-3 days]],PROVIDER:[TOKEN:[[id number]],FOLLOWUP:[1-3 days]],PROVIDER:[TOKEN:[[id number]],FOLLOWUP:[1-3 days]] PROVIDER:[TOKEN:[[id number]],FOLLOWUP:[1-3 days]],PROVIDER:[TOKEN:[[id number]],FOLLOWUP:[1-3 days]],PROVIDER:[TOKEN:[[id number]],FOLLOWUP:[1-3 days]]

## 2023-12-18 NOTE — PROGRESS NOTE ADULT - SUBJECTIVE AND OBJECTIVE BOX
INTERVAL HPI/OVERNIGHT EVENTS:    CC: hyperbilirubinemia, abnormal lipid panel    No overnight events  feels ok  discussed abnormal lipid panel  last year had normal lipid panel.    Vital Signs Last 24 Hrs  T(C): 36.6 (18 Dec 2023 10:06), Max: 36.8 (18 Dec 2023 04:05)  T(F): 97.8 (18 Dec 2023 10:06), Max: 98.2 (18 Dec 2023 04:05)  HR: 80 (18 Dec 2023 10:06) (72 - 93)  BP: 105/66 (18 Dec 2023 10:06) (103/68 - 114/66)  BP(mean): --  RR: 18 (18 Dec 2023 10:06) (18 - 18)  SpO2: 95% (18 Dec 2023 10:06) (95% - 98%)    Parameters below as of 18 Dec 2023 10:06  Patient On (Oxygen Delivery Method): room air        PHYSICAL EXAM:    GENERAL: icterus, not in distress  CHEST/LUNG: b/la ir entry  HEART: reg  ABDOMEN: soft, bs+  EXTREMITIES:  no edema, tenderness    MEDICATIONS  (STANDING):  influenza   Vaccine 0.5 milliLiter(s) IntraMuscular once  nystatin Powder 1 Application(s) Topical two times a day    MEDICATIONS  (PRN):  aluminum hydroxide/magnesium hydroxide/simethicone Suspension 30 milliLiter(s) Oral every 4 hours PRN Dyspepsia  hydrOXYzine hydrochloride 25 milliGRAM(s) Oral four times a day PRN Itching  melatonin 3 milliGRAM(s) Oral at bedtime PRN Insomnia  ondansetron Injectable 4 milliGRAM(s) IV Push every 8 hours PRN Nausea and/or Vomiting      Allergies    tetracycline (Hives)    Intolerances          LABS:                          12.2   4.23  )-----------( 322      ( 18 Dec 2023 05:20 )             34.6     12-18    138  |  104  |  7.8<L>  ----------------------------<  96  3.8   |  20.0<L>  |  0.52    Ca    9.1      18 Dec 2023 05:20  Phos  3.9     12-17  Mg     2.2     12-17    TPro  6.1<L>  /  Alb  3.7  /  TBili  10.2<H>  /  DBili  x   /  AST  66<H>  /  ALT  79<H>  /  AlkPhos  212<H>  12-18    PT/INR - ( 18 Dec 2023 05:20 )   PT: 9.8 sec;   INR: 0.88 ratio           Urinalysis Basic - ( 18 Dec 2023 05:20 )    Color: x / Appearance: x / SG: x / pH: x  Gluc: 96 mg/dL / Ketone: x  / Bili: x / Urobili: x   Blood: x / Protein: x / Nitrite: x   Leuk Esterase: x / RBC: x / WBC x   Sq Epi: x / Non Sq Epi: x / Bacteria: x        RADIOLOGY & ADDITIONAL TESTS:

## 2023-12-18 NOTE — PROGRESS NOTE ADULT - NS ATTEST RISK PROBLEM GEN_ALL_CORE FT
Elevated LFT     bilirubin decreased to  10.8     I spoke to hospitalist regarding plan for discharge in am. I spoke to IR regarding liver bx today   lft ordered for tomorrow.      MRCP- my interpretation- normal cbd, no liver masses

## 2023-12-18 NOTE — PROGRESS NOTE ADULT - SUBJECTIVE AND OBJECTIVE BOX
Chief Complaint:  Patient is a 58y old  Female who presents with a chief complaint of Increase in bilirubin (18 Dec 2023 09:19)      HPI/ 24 hr events: Patient seen and examined at bedside. Pt feeling well this morning. She is tolerating diet. Currently NPO for liver biopsy today. Denies nausea, vomiting, diarrhea, abdominal pain, hematemesis, hematochezia, melena. Vitals are overall stable, LFTs are improving. Planned for liver biopsy today.       REVIEW OF SYSTEMS:   General: Negative  HEENT: Negative  CV: Negative  Respiratory: Negative  GI: See HPI  : Negative  MSK: Negative  Hematologic: Negative  Skin: Negative    MEDICATIONS:   MEDICATIONS  (STANDING):  influenza   Vaccine 0.5 milliLiter(s) IntraMuscular once  nystatin Powder 1 Application(s) Topical two times a day    MEDICATIONS  (PRN):  aluminum hydroxide/magnesium hydroxide/simethicone Suspension 30 milliLiter(s) Oral every 4 hours PRN Dyspepsia  hydrOXYzine hydrochloride 25 milliGRAM(s) Oral four times a day PRN Itching  melatonin 3 milliGRAM(s) Oral at bedtime PRN Insomnia  ondansetron Injectable 4 milliGRAM(s) IV Push every 8 hours PRN Nausea and/or Vomiting            DIET:  Diet, NPO after Midnight:      NPO Start Date: 17-Dec-2023,   NPO Start Time: 23:59  Except Medications (12-17-23 @ 11:02) [Active]  Diet, Regular (12-16-23 @ 00:29) [Active]          ALLERGIES:   Allergies    tetracycline (Hives)    Intolerances        VITAL SIGNS:   Vital Signs Last 24 Hrs  T(C): 36.8 (18 Dec 2023 04:05), Max: 36.8 (18 Dec 2023 04:05)  T(F): 98.2 (18 Dec 2023 04:05), Max: 98.2 (18 Dec 2023 04:05)  HR: 80 (18 Dec 2023 04:05) (72 - 93)  BP: 103/68 (18 Dec 2023 04:05) (103/68 - 114/66)  BP(mean): --  RR: 18 (18 Dec 2023 04:05) (18 - 18)  SpO2: 98% (18 Dec 2023 04:05) (96% - 98%)    Parameters below as of 17 Dec 2023 13:30  Patient On (Oxygen Delivery Method): room air      I&O's Summary      PHYSICAL EXAM:   GENERAL:  No acute distress  HEENT:  NC/AT, conjunctiva clear, sclera icteric  CHEST:  No increased effort  HEART:  Regular rate  ABDOMEN:  Soft, non-tender, non-distended, normoactive bowel sounds, no rebound or guarding  EXTREMITIES: No edema  SKIN:  Warm, dry, jaundiced   NEURO:  Calm, cooperative, no asterixis, AOX4    LABS:                        12.2   4.23  )-----------( 322      ( 18 Dec 2023 05:20 )             34.6     Hemoglobin: 12.2 g/dL (12-18-23 @ 05:20)  Hemoglobin: 13.8 g/dL (12-16-23 @ 09:33)  Hemoglobin: 13.5 g/dL (12-15-23 @ 20:09)    12-18    138  |  104  |  7.8<L>  ----------------------------<  96  3.8   |  20.0<L>  |  0.52    Ca    9.1      18 Dec 2023 05:20  Phos  3.9     12-17  Mg     2.2     12-17    TPro  6.1<L>  /  Alb  3.7  /  TBili  10.2<H>  /  DBili  x   /  AST  66<H>  /  ALT  79<H>  /  AlkPhos  212<H>  12-18    LIVER FUNCTIONS - ( 18 Dec 2023 05:20 )  Alb: 3.7 g/dL / Pro: 6.1 g/dL / ALK PHOS: 212 U/L / ALT: 79 U/L / AST: 66 U/L / GGT: x             PT/INR - ( 18 Dec 2023 05:20 )   PT: 9.8 sec;   INR: 0.88 ratio                                     Triglycerides, Serum: 407 mg/dL (12-18-23 @ 05:20)              RADIOLOGY & ADDITIONAL STUDIES:      ACC: [id number] EXAM:  MR MRCP   ORDERED BY: YAIMA SANDOVAL     PROCEDURE DATE:  12/16/2023          INTERPRETATION:  CLINICAL INFORMATION: Jaundice. Worsening bilirubin.    COMPARISON: CT abdomen pelvis December 7, 2023.    CONTRAST/COMPLICATIONS:  IV Contrast: NONE  Oral Contrast: NONE  Complications: None reported at time of study completion    PROCEDURE:  MRI of the abdomen was performed.  MRCP was performed.    FINDINGS:  LOWER CHEST: Within normal limits.    LIVER: Within normal limits.  BILE DUCTS: Normal caliber. Normal course and contour. No filling   defects/choledocholithiasis.  GALLBLADDER: Within normal limits. No gallstones.  SPLEEN: Within normal limits.  PANCREAS: Within normal limits.  ADRENALS: Within normal limits.  KIDNEYS/URETERS: Within normal limits.    VISUALIZED PORTIONS:  BOWEL: No bowel obstruction.  PERITONEUM: No ascites.  VESSELS: Within normal limits.  RETROPERITONEUM/LYMPH NODES: No lymphadenopathy.  ABDOMINAL WALL: Within normal limits.  BONES: Within normal limits.    IMPRESSION:  Unremarkable MRI/MRCP.        --- End of Report ---            JH MURILLO MD; Attending Radiologist  This document has been electronically signed. Dec 17 2023  8:49AM  12-16-23 @ 22:18    --   ACC: [id number] EXAM:  US DPLX ABDOMEN   ORDERED BY: JANIE PAULINO     PROCEDURE DATE:  12/17/2023          INTERPRETATION:  CLINICAL INFORMATION: Evaluate for portal vein   thrombosis.    COMPARISON: MRI liver December 16, 2023.    TECHNIQUE: Grayscale, Color and Spectral Doppler of the liver vasculature    FINDINGS:    Liver measures 12.7 cm. No focal space-occupying disease.    Portal vein patent with normal hepatopedal flow.    Hepatic veins patent.    Hepatic artery patent.    IMPRESSION:    Patent portal vein without thrombosis.    --- End of Report ---            SOFÍA MOY MD; Attending Radiologist  This document has been electronically signed. Dec 17 2023 11:43PM -- --             Chief Complaint:  Patient is a 58y old  Female who presents with a chief complaint of Increase in bilirubin (18 Dec 2023 09:19)      HPI/ 24 hr events: Patient seen and examined at bedside. Pt feeling well this morning. She is tolerating diet. Currently NPO for liver biopsy today. Denies nausea, vomiting, diarrhea, abdominal pain, hematemesis, hematochezia, melena. Vitals are overall stable, LFTs are improving. Planned for liver biopsy today.       REVIEW OF SYSTEMS:   General: Negative  HEENT: Negative  CV: Negative  Respiratory: Negative  GI: See HPI  : Negative  MSK: Negative  Hematologic: Negative  Skin: Negative    MEDICATIONS:   MEDICATIONS  (STANDING):  influenza   Vaccine 0.5 milliLiter(s) IntraMuscular once  nystatin Powder 1 Application(s) Topical two times a day    MEDICATIONS  (PRN):  aluminum hydroxide/magnesium hydroxide/simethicone Suspension 30 milliLiter(s) Oral every 4 hours PRN Dyspepsia  hydrOXYzine hydrochloride 25 milliGRAM(s) Oral four times a day PRN Itching  melatonin 3 milliGRAM(s) Oral at bedtime PRN Insomnia  ondansetron Injectable 4 milliGRAM(s) IV Push every 8 hours PRN Nausea and/or Vomiting            DIET:  Diet, NPO after Midnight:      NPO Start Date: 17-Dec-2023,   NPO Start Time: 23:59  Except Medications (12-17-23 @ 11:02) [Active]  Diet, Regular (12-16-23 @ 00:29) [Active]          ALLERGIES:   Allergies    tetracycline (Hives)    Intolerances        VITAL SIGNS:   Vital Signs Last 24 Hrs  T(C): 36.8 (18 Dec 2023 04:05), Max: 36.8 (18 Dec 2023 04:05)  T(F): 98.2 (18 Dec 2023 04:05), Max: 98.2 (18 Dec 2023 04:05)  HR: 80 (18 Dec 2023 04:05) (72 - 93)  BP: 103/68 (18 Dec 2023 04:05) (103/68 - 114/66)  BP(mean): --  RR: 18 (18 Dec 2023 04:05) (18 - 18)  SpO2: 98% (18 Dec 2023 04:05) (96% - 98%)    Parameters below as of 17 Dec 2023 13:30  Patient On (Oxygen Delivery Method): room air      I&O's Summary      PHYSICAL EXAM:   GENERAL:  No acute distress  HEENT:  NC/AT, conjunctiva clear, sclera icteric  CHEST:  No increased effort  HEART:  Regular rate  ABDOMEN:  Soft, non-tender, non-distended, normoactive bowel sounds, no rebound or guarding  EXTREMITIES: No edema  SKIN:  Warm, dry, jaundiced   NEURO:  Calm, cooperative, no asterixis, AOX4    LABS:                        12.2   4.23  )-----------( 322      ( 18 Dec 2023 05:20 )             34.6     Hemoglobin: 12.2 g/dL (12-18-23 @ 05:20)  Hemoglobin: 13.8 g/dL (12-16-23 @ 09:33)  Hemoglobin: 13.5 g/dL (12-15-23 @ 20:09)    12-18    138  |  104  |  7.8<L>  ----------------------------<  96  3.8   |  20.0<L>  |  0.52    Ca    9.1      18 Dec 2023 05:20  Phos  3.9     12-17  Mg     2.2     12-17    TPro  6.1<L>  /  Alb  3.7  /  TBili  10.2<H>  /  DBili  x   /  AST  66<H>  /  ALT  79<H>  /  AlkPhos  212<H>  12-18    LIVER FUNCTIONS - ( 18 Dec 2023 05:20 )  Alb: 3.7 g/dL / Pro: 6.1 g/dL / ALK PHOS: 212 U/L / ALT: 79 U/L / AST: 66 U/L / GGT: x             PT/INR - ( 18 Dec 2023 05:20 )   PT: 9.8 sec;   INR: 0.88 ratio                                     Triglycerides, Serum: 407 mg/dL (12-18-23 @ 05:20)              RADIOLOGY & ADDITIONAL STUDIES:      ACC: [id number] EXAM:  MR MRCP   ORDERED BY: YAIMA SANDOVAL     PROCEDURE DATE:  12/16/2023          INTERPRETATION:  CLINICAL INFORMATION: Jaundice. Worsening bilirubin.    COMPARISON: CT abdomen pelvis December 7, 2023.    CONTRAST/COMPLICATIONS:  IV Contrast: NONE  Oral Contrast: NONE  Complications: None reported at time of study completion    PROCEDURE:  MRI of the abdomen was performed.  MRCP was performed.    FINDINGS:  LOWER CHEST: Within normal limits.    LIVER: Within normal limits.  BILE DUCTS: Normal caliber. Normal course and contour. No filling   defects/choledocholithiasis.  GALLBLADDER: Within normal limits. No gallstones.  SPLEEN: Within normal limits.  PANCREAS: Within normal limits.  ADRENALS: Within normal limits.  KIDNEYS/URETERS: Within normal limits.    VISUALIZED PORTIONS:  BOWEL: No bowel obstruction.  PERITONEUM: No ascites.  VESSELS: Within normal limits.  RETROPERITONEUM/LYMPH NODES: No lymphadenopathy.  ABDOMINAL WALL: Within normal limits.  BONES: Within normal limits.    IMPRESSION:  Unremarkable MRI/MRCP.        --- End of Report ---            JH MURILLO MD; Attending Radiologist  This document has been electronically signed. Dec 17 2023  8:49AM  12-16-23 @ 22:18    --   ACC: [id number] EXAM:  US DPLX ABDOMEN   ORDERED BY: JANIE PAULINO     PROCEDURE DATE:  12/17/2023          INTERPRETATION:  CLINICAL INFORMATION: Evaluate for portal vein   thrombosis.    COMPARISON: MRI liver December 16, 2023.    TECHNIQUE: Grayscale, Color and Spectral Doppler of the liver vasculature    FINDINGS:    Liver measures 12.7 cm. No focal space-occupying disease.    Portal vein patent with normal hepatopedal flow.    Hepatic veins patent.    Hepatic artery patent.    IMPRESSION:    Patent portal vein without thrombosis.    --- End of Report ---            SOFÍA MOY MD; Attending Radiologist  This document has been electronically signed. Dec 17 2023 11:43PM -- --

## 2023-12-18 NOTE — PROGRESS NOTE ADULT - NUTRITIONAL ASSESSMENT
This patient has been assessed with a concern for Malnutrition and has been determined to have a diagnosis/diagnoses of Severe protein-calorie malnutrition.    This patient is being managed with:   Diet NPO after Midnight-     NPO Start Date: 17-Dec-2023   NPO Start Time: 23:59  Except Medications  Entered: Dec 17 2023 11:02AM    Diet Regular-  Entered: Dec 16 2023 12:26AM

## 2023-12-18 NOTE — DISCHARGE NOTE PROVIDER - HOSPITAL COURSE
58 year old female with no known PMHx, recently admitted for transaminitis, who presented to [facility] ED by her Hepatologist for MRCP and Liver Biopsy after outpatient labs revealed worsening bilirubin elevation. Recent workup revealed early EBV Ag positive. GI was consulted, and patient was admitted for workup of hyperbilirubinemia. Patient underwent MRCP which was negative for obstruction. IR guided liver biopsy pending. 58 year old female with no known PMHx, recently admitted for transaminitis, who presented to [facility] ED by her Hepatologist for MRCP and Liver Biopsy after outpatient labs revealed worsening bilirubin elevation. Recent workup revealed early EBV Ag positive. GI was consulted, and patient was admitted for workup of hyperbilirubinemia. Patient underwent MRCP which was negative for obstruction. IR guided liver biopsy pending. 58 yr old female with no known medical history, recently admitted for transaminitis was sent in for evaluation by her hepatologist for MRCP and liver biopsy. Noted to have recent worsening bilirubin. Work up done recently revealed early EBV Ag positive. GI evaluation was requested. MRCP was ordered, revealed no obstruction. IR consulted for liver biopsy, performed on 12/18. Noted to have abnormal lipid profile, advised to repeat and follow up outpatient. She is medically stable for discharge home and advised close follow up with GI and hepatology on discharge.

## 2023-12-18 NOTE — DISCHARGE NOTE PROVIDER - NSDCCPCAREPLAN_GEN_ALL_CORE_FT
PRINCIPAL DISCHARGE DIAGNOSIS  Diagnosis: Elevated bilirubin  Assessment and Plan of Treatment: - Sent by your Hepatologist for inpatient workup. MRCP was negative for obstruction.   - IR guided Liver biopsy performed, follow up outpatient for results.   - Continue medications as directed and follow up with your PCP and hepatology.     PRINCIPAL DISCHARGE DIAGNOSIS  Diagnosis: Elevated bilirubin  Assessment and Plan of Treatment: - Sent by your Hepatologist for inpatient workup. MRCP was negative for obstruction.   - IR guided Liver biopsy performed, follow up outpatient for results.   - Continue medications as directed and follow up with your PCP and hepatology.      SECONDARY DISCHARGE DIAGNOSES  Diagnosis: Lipids abnormal  Assessment and Plan of Treatment: repeat labs in 1 week

## 2023-12-18 NOTE — DISCHARGE NOTE PROVIDER - DETAILS OF MALNUTRITION DIAGNOSIS/DIAGNOSES
This patient has been assessed with a concern for Malnutrition and was treated during this hospitalization for the following Nutrition diagnosis/diagnoses:     -  12/17/2023: Severe protein-calorie malnutrition

## 2023-12-18 NOTE — DISCHARGE NOTE PROVIDER - CARE PROVIDER_API CALL
MEETA THOMPSON  [address] [place] [address]  Phone: [phone]  Fax: [phone]  Follow Up Time: 1-3 days    Khushboo Chacko  Transplant Hepatology  [facility], [address] [address]  Phone: [phone]  Fax: [phone]  Follow Up Time: 1-3 days    Gabe Guzman  Gastroenterology  [address] [place] [address], [address] [address]  Phone: [phone]  Fax: [phone]  Follow Up Time: 1-3 days   MEETA THOMPSON  [address] [place] [address]  Phone: [phone]  Fax: [phone]  Follow Up Time: 1-3 days    Khushboo Chacko  Transplant Hepatology  [address] [facility], [address] [address]  Phone: [phone]  Fax: [phone]  Follow Up Time: 1-3 days    Gabe Guzman  Gastroenterology  [address] [place] [address], [address] [address]  Phone: [phone]  Fax: [phone]  Follow Up Time: 1-3 days

## 2023-12-18 NOTE — PROGRESS NOTE ADULT - SUBJECTIVE AND OBJECTIVE BOX
IR Post Procedure Note    Diagnosis: Elevated LFTs    Procedure: Liver Biopsy    : Paco Mixon MD    Contrast: None    Anesthesia: 1% Lidocaine Subcutaneous, 1mg versed, 50 mcg fentanyl    Estimated Blood Loss: Less than 10cc    Specimens: Specimens identified, labeled, confirmed and sent to lab.    Complications: No Immediate Complications    Anticoagulation: Resume in 48 Hours    Findings & Plan: Single 18g core bx of liver obtained w Biopince biopsy needle under US guidance. No hematoma or complications on post procedure US.      Please call Interventional Radiology with any questions, concerns, or issues.

## 2023-12-18 NOTE — DISCHARGE NOTE PROVIDER - NSDCFUSCHEDAPPT_GEN_ALL_CORE_FT
Khushboo Chacko  [facility] Physician [facility]  [facility] [address]  Scheduled Appointment: 01/04/2024    Gabe Guzman  [facility] Physician [facility]  [facility]  Scheduled Appointment: 01/10/2024     Khushboo Chacko  [facility] Physician [facility]  [facility]STR[address]  Scheduled Appointment: 01/04/2024    Gabe Guzman  [facility] Physician [facility]  [facility]STR[address]  Scheduled Appointment: 01/10/2024

## 2023-12-18 NOTE — DISCHARGE NOTE PROVIDER - ATTENDING DISCHARGE PHYSICAL EXAMINATION:
GENERAL: icterus, not in distress  CHEST/LUNG: b/la ir entry  HEART: reg  ABDOMEN: soft, bs+  EXTREMITIES:  no edema, tenderness

## 2023-12-18 NOTE — PROGRESS NOTE ADULT - ASSESSMENT
58 yr old female with no known medical history, recently admitted for transaminitis was sent in for evaluation by her hepatologist for MRCP and liver biopsy. Noted to have recent worsening bilirubin. Work up done recently revealed early EBV Ag positive. GI evaluation was requested. MRCP was ordered, revealed no obstruction. IR consulted for liver biopsy.    1. Hyperbilirubinemia:  MRCP with no obstruction.  GI following  Monitor LFTs.  discussed with IR, plan for liver biopsy today.    2. Abnormal lipid profile:  Elevated TG and Cholesterol  patient reports normal lipid panel last year, no family history of hyperlipidemia  will repeat lipid panel    3. DVT ppx:  SCDs    Discussed with patient and GI.

## 2023-12-18 NOTE — PROGRESS NOTE ADULT - ASSESSMENT
57 y/o F no PMHx presented to ED after referred by hepatologist Dr. Chacko for worsening liver enzymes    #Elevated liver enzymes  CT A/P 12/7/23- no acute findings- normal liver, normal caliber bile ducts  MRI/MRCP 12/17/23- unremarkable  US Duplex 12/27/23- patent portal vein without thrombosis   Recent viral illness; uses herbal supplements (goldenrod), forages for mushrooms (lion's frances and reishi)  Liver w/u has been negative so far     - Liver biopsy pending, planned for today. Please obtain stat pathology    - EBV IgM (-), IgG + VCA/EA/NA. EBV PCR pending   - Trend CBC, LFTs, coags daily  - Avoid hepatotoxins  - Avoid herbal supplements and mushrooms   - Can take up to 2G Tylenol per day. NO NSAIDs   - Discussed case with hepatologist Dr. Chacko  _________________________________________________________________  Assessment and recommendations are final when note is signed by the attending physician.

## 2023-12-18 NOTE — PROGRESS NOTE ADULT - NS ATTEND AMEND GEN_ALL_CORE FT
Generalized weakness. No abdominal pain. elevated LFT    bilirubin decreased to  10.8     I spoke to hospitalist regarding plan for discharge in am. I spoke to IR regarding liver bx today   lft ordered for tomorrow.      MRCP- my interpretation- normal cbd, no liver masses

## 2023-12-19 ENCOUNTER — TRANSCRIPTION ENCOUNTER (OUTPATIENT)
Age: 58
End: 2023-12-19

## 2023-12-19 VITALS
DIASTOLIC BLOOD PRESSURE: 68 MMHG | OXYGEN SATURATION: 97 % | TEMPERATURE: 99 F | SYSTOLIC BLOOD PRESSURE: 132 MMHG | HEART RATE: 78 BPM | RESPIRATION RATE: 18 BRPM

## 2023-12-19 LAB
ALBUMIN SERPL ELPH-MCNC: 3.6 G/DL — SIGNIFICANT CHANGE UP (ref 3.3–5.2)
ALBUMIN SERPL ELPH-MCNC: 3.6 G/DL — SIGNIFICANT CHANGE UP (ref 3.3–5.2)
ALP SERPL-CCNC: 231 U/L — HIGH (ref 40–120)
ALP SERPL-CCNC: 231 U/L — HIGH (ref 40–120)
ALT FLD-CCNC: 75 U/L — HIGH
ALT FLD-CCNC: 75 U/L — HIGH
ANION GAP SERPL CALC-SCNC: 15 MMOL/L — SIGNIFICANT CHANGE UP (ref 5–17)
ANION GAP SERPL CALC-SCNC: 15 MMOL/L — SIGNIFICANT CHANGE UP (ref 5–17)
AST SERPL-CCNC: 65 U/L — HIGH
AST SERPL-CCNC: 65 U/L — HIGH
BILIRUB SERPL-MCNC: 11.2 MG/DL — HIGH (ref 0.4–2)
BILIRUB SERPL-MCNC: 11.2 MG/DL — HIGH (ref 0.4–2)
BUN SERPL-MCNC: 9 MG/DL — SIGNIFICANT CHANGE UP (ref 8–20)
BUN SERPL-MCNC: 9 MG/DL — SIGNIFICANT CHANGE UP (ref 8–20)
CALCIUM SERPL-MCNC: 9.3 MG/DL — SIGNIFICANT CHANGE UP (ref 8.4–10.5)
CALCIUM SERPL-MCNC: 9.3 MG/DL — SIGNIFICANT CHANGE UP (ref 8.4–10.5)
CHLORIDE SERPL-SCNC: 103 MMOL/L — SIGNIFICANT CHANGE UP (ref 96–108)
CHLORIDE SERPL-SCNC: 103 MMOL/L — SIGNIFICANT CHANGE UP (ref 96–108)
CHOLEST SERPL-MCNC: 199 MG/DL — SIGNIFICANT CHANGE UP
CHOLEST SERPL-MCNC: 199 MG/DL — SIGNIFICANT CHANGE UP
CO2 SERPL-SCNC: 20 MMOL/L — LOW (ref 22–29)
CO2 SERPL-SCNC: 20 MMOL/L — LOW (ref 22–29)
CREAT SERPL-MCNC: 0.42 MG/DL — LOW (ref 0.5–1.3)
CREAT SERPL-MCNC: 0.42 MG/DL — LOW (ref 0.5–1.3)
EBV DNA SERPL NAA+PROBE-ACNC: SIGNIFICANT CHANGE UP IU/ML
EBV DNA SERPL NAA+PROBE-ACNC: SIGNIFICANT CHANGE UP IU/ML
EBVPCR LOG: SIGNIFICANT CHANGE UP LOG10IU/ML
EBVPCR LOG: SIGNIFICANT CHANGE UP LOG10IU/ML
EGFR: 113 ML/MIN/1.73M2 — SIGNIFICANT CHANGE UP
EGFR: 113 ML/MIN/1.73M2 — SIGNIFICANT CHANGE UP
GLUCOSE SERPL-MCNC: 106 MG/DL — HIGH (ref 70–99)
GLUCOSE SERPL-MCNC: 106 MG/DL — HIGH (ref 70–99)
HCT VFR BLD CALC: 34.2 % — LOW (ref 34.5–45)
HCT VFR BLD CALC: 34.2 % — LOW (ref 34.5–45)
HDLC SERPL-MCNC: 9 MG/DL — LOW
HDLC SERPL-MCNC: 9 MG/DL — LOW
HGB BLD-MCNC: 11.7 G/DL — SIGNIFICANT CHANGE UP (ref 11.5–15.5)
HGB BLD-MCNC: 11.7 G/DL — SIGNIFICANT CHANGE UP (ref 11.5–15.5)
INR BLD: 0.88 RATIO — SIGNIFICANT CHANGE UP (ref 0.85–1.18)
INR BLD: 0.88 RATIO — SIGNIFICANT CHANGE UP (ref 0.85–1.18)
LIPID PNL WITH DIRECT LDL SERPL: SIGNIFICANT CHANGE UP MG/DL
LIPID PNL WITH DIRECT LDL SERPL: SIGNIFICANT CHANGE UP MG/DL
MCHC RBC-ENTMCNC: 28.3 PG — SIGNIFICANT CHANGE UP (ref 27–34)
MCHC RBC-ENTMCNC: 28.3 PG — SIGNIFICANT CHANGE UP (ref 27–34)
MCHC RBC-ENTMCNC: 34.2 GM/DL — SIGNIFICANT CHANGE UP (ref 32–36)
MCHC RBC-ENTMCNC: 34.2 GM/DL — SIGNIFICANT CHANGE UP (ref 32–36)
MCV RBC AUTO: 82.6 FL — SIGNIFICANT CHANGE UP (ref 80–100)
MCV RBC AUTO: 82.6 FL — SIGNIFICANT CHANGE UP (ref 80–100)
NON HDL CHOLESTEROL: 190 MG/DL — HIGH
NON HDL CHOLESTEROL: 190 MG/DL — HIGH
PLATELET # BLD AUTO: 327 K/UL — SIGNIFICANT CHANGE UP (ref 150–400)
PLATELET # BLD AUTO: 327 K/UL — SIGNIFICANT CHANGE UP (ref 150–400)
POTASSIUM SERPL-MCNC: 3.8 MMOL/L — SIGNIFICANT CHANGE UP (ref 3.5–5.3)
POTASSIUM SERPL-MCNC: 3.8 MMOL/L — SIGNIFICANT CHANGE UP (ref 3.5–5.3)
POTASSIUM SERPL-SCNC: 3.8 MMOL/L — SIGNIFICANT CHANGE UP (ref 3.5–5.3)
POTASSIUM SERPL-SCNC: 3.8 MMOL/L — SIGNIFICANT CHANGE UP (ref 3.5–5.3)
PROT SERPL-MCNC: 6.3 G/DL — LOW (ref 6.6–8.7)
PROT SERPL-MCNC: 6.3 G/DL — LOW (ref 6.6–8.7)
PROTHROM AB SERPL-ACNC: 9.8 SEC — SIGNIFICANT CHANGE UP (ref 9.5–13)
PROTHROM AB SERPL-ACNC: 9.8 SEC — SIGNIFICANT CHANGE UP (ref 9.5–13)
RBC # BLD: 4.14 M/UL — SIGNIFICANT CHANGE UP (ref 3.8–5.2)
RBC # BLD: 4.14 M/UL — SIGNIFICANT CHANGE UP (ref 3.8–5.2)
RBC # FLD: 15.9 % — HIGH (ref 10.3–14.5)
RBC # FLD: 15.9 % — HIGH (ref 10.3–14.5)
SODIUM SERPL-SCNC: 138 MMOL/L — SIGNIFICANT CHANGE UP (ref 135–145)
SODIUM SERPL-SCNC: 138 MMOL/L — SIGNIFICANT CHANGE UP (ref 135–145)
TRIGL SERPL-MCNC: 418 MG/DL — HIGH
TRIGL SERPL-MCNC: 418 MG/DL — HIGH
WBC # BLD: 4.49 K/UL — SIGNIFICANT CHANGE UP (ref 3.8–10.5)
WBC # BLD: 4.49 K/UL — SIGNIFICANT CHANGE UP (ref 3.8–10.5)
WBC # FLD AUTO: 4.49 K/UL — SIGNIFICANT CHANGE UP (ref 3.8–10.5)
WBC # FLD AUTO: 4.49 K/UL — SIGNIFICANT CHANGE UP (ref 3.8–10.5)

## 2023-12-19 PROCEDURE — 87799 DETECT AGENT NOS DNA QUANT: CPT

## 2023-12-19 PROCEDURE — 88312 SPECIAL STAINS GROUP 1: CPT

## 2023-12-19 PROCEDURE — 84100 ASSAY OF PHOSPHORUS: CPT

## 2023-12-19 PROCEDURE — 80048 BASIC METABOLIC PNL TOTAL CA: CPT

## 2023-12-19 PROCEDURE — 82248 BILIRUBIN DIRECT: CPT

## 2023-12-19 PROCEDURE — 85610 PROTHROMBIN TIME: CPT

## 2023-12-19 PROCEDURE — 80076 HEPATIC FUNCTION PANEL: CPT

## 2023-12-19 PROCEDURE — 86850 RBC ANTIBODY SCREEN: CPT

## 2023-12-19 PROCEDURE — 86900 BLOOD TYPING SEROLOGIC ABO: CPT

## 2023-12-19 PROCEDURE — 80053 COMPREHEN METABOLIC PANEL: CPT

## 2023-12-19 PROCEDURE — 85025 COMPLETE CBC W/AUTO DIFF WBC: CPT

## 2023-12-19 PROCEDURE — 99232 SBSQ HOSP IP/OBS MODERATE 35: CPT

## 2023-12-19 PROCEDURE — 93975 VASCULAR STUDY: CPT

## 2023-12-19 PROCEDURE — 80061 LIPID PANEL: CPT

## 2023-12-19 PROCEDURE — 85730 THROMBOPLASTIN TIME PARTIAL: CPT

## 2023-12-19 PROCEDURE — 88313 SPECIAL STAINS GROUP 2: CPT

## 2023-12-19 PROCEDURE — 99239 HOSP IP/OBS DSCHRG MGMT >30: CPT

## 2023-12-19 PROCEDURE — 88341 IMHCHEM/IMCYTCHM EA ADD ANTB: CPT

## 2023-12-19 PROCEDURE — 86901 BLOOD TYPING SEROLOGIC RH(D): CPT

## 2023-12-19 PROCEDURE — 83540 ASSAY OF IRON: CPT

## 2023-12-19 PROCEDURE — 99285 EMERGENCY DEPT VISIT HI MDM: CPT

## 2023-12-19 PROCEDURE — 74181 MRI ABDOMEN W/O CONTRAST: CPT | Mod: MG

## 2023-12-19 PROCEDURE — 88365 INSITU HYBRIDIZATION (FISH): CPT

## 2023-12-19 PROCEDURE — G1004: CPT

## 2023-12-19 PROCEDURE — 88307 TISSUE EXAM BY PATHOLOGIST: CPT

## 2023-12-19 PROCEDURE — 36415 COLL VENOUS BLD VENIPUNCTURE: CPT

## 2023-12-19 PROCEDURE — 83735 ASSAY OF MAGNESIUM: CPT

## 2023-12-19 PROCEDURE — 76942 ECHO GUIDE FOR BIOPSY: CPT

## 2023-12-19 PROCEDURE — 85027 COMPLETE CBC AUTOMATED: CPT

## 2023-12-19 NOTE — PROGRESS NOTE ADULT - SUBJECTIVE AND OBJECTIVE BOX
Pt seen and examined for chief complaint of abnormal LFT's    Pt. seen this AM. Status post IR directed liver biopsy yesterday which she appears to have tolerated well w/o post procedure c/o abdominal pain. MRI/ MRCP negative for hepatobiliary pathology. No GI complaints offered this AM.    REVIEW OF SYSTEMS:  Constitutional: No fever, weight loss or fatigue  Cardiovascular: No chest pain, palpitations, dizziness or leg swelling  Gastrointestinal: No abdominal or epigastric pain. No nausea, vomiting or hematemesis; No diarrhea or constipation. No melena or hematochezia.  Skin: No itching, burning, rashes or lesions       MEDICATIONS:  MEDICATIONS  (STANDING):  influenza   Vaccine 0.5 milliLiter(s) IntraMuscular once  nystatin Powder 1 Application(s) Topical two times a day    MEDICATIONS  (PRN):  aluminum hydroxide/magnesium hydroxide/simethicone Suspension 30 milliLiter(s) Oral every 4 hours PRN Dyspepsia  hydrOXYzine hydrochloride 25 milliGRAM(s) Oral four times a day PRN Itching  melatonin 3 milliGRAM(s) Oral at bedtime PRN Insomnia  ondansetron Injectable 4 milliGRAM(s) IV Push every 8 hours PRN Nausea and/or Vomiting      Allergies    tetracycline (Hives)    Intolerances        Vital Signs Last 24 Hrs  T(C): 36.8 (19 Dec 2023 04:55), Max: 36.8 (18 Dec 2023 20:34)  T(F): 98.2 (19 Dec 2023 04:55), Max: 98.2 (18 Dec 2023 20:34)  HR: 69 (19 Dec 2023 04:55) (69 - 83)  BP: 112/59 (19 Dec 2023 04:55) (103/72 - 124/79)  BP(mean): --  RR: 18 (19 Dec 2023 04:55) (18 - 18)  SpO2: 96% (19 Dec 2023 04:55) (95% - 97%)    Parameters below as of 19 Dec 2023 04:55  Patient On (Oxygen Delivery Method): room air          PHYSICAL EXAM:    General: Well developed; in no acute distress  HEENT: MMM, conjunctiva and sclera clear  Lungs: clear to auscultation and percussion.  Cor: RRR S1, S2 only w/o mrg or clicks  Gastrointestinal:Abdomen: Soft non-tender non-distended; Normal bowel sounds; No hepatosplenomegaly  no surgical scars.  PAULA: Good sphincter tone, no robles-anal pathology, no masses or tenderness, brown, hemoccult negative stool.  Extremities: no cyanosis, clubbing or edema.  Skin: Warm and dry. No obvious rash  Neuro: Pt. a + o x 3, no tremulousness or asterixsis    LABS:      CBC Full  -  ( 19 Dec 2023 06:42 )  WBC Count : 4.49 K/uL  RBC Count : 4.14 M/uL  Hemoglobin : 11.7 g/dL  Hematocrit : 34.2 %  Platelet Count - Automated : 327 K/uL  Mean Cell Volume : 82.6 fl  Mean Cell Hemoglobin : 28.3 pg  Mean Cell Hemoglobin Concentration : 34.2 gm/dL  Auto Neutrophil # : x  Auto Lymphocyte # : x  Auto Monocyte # : x  Auto Eosinophil # : x  Auto Basophil # : x  Auto Neutrophil % : x  Auto Lymphocyte % : x  Auto Monocyte % : x  Auto Eosinophil % : x  Auto Basophil % : x    12-19    138  |  103  |  9.0  ----------------------------<  106<H>  3.8   |  20.0<L>  |  0.42<L>    Ca    9.3      19 Dec 2023 06:42    TPro  6.3<L>  /  Alb  3.6  /  TBili  11.2<H>  /  DBili  x   /  AST  65<H>  /  ALT  75<H>  /  AlkPhos  231<H>  12-19    PT/INR - ( 19 Dec 2023 06:42 )   PT: 9.8 sec;   INR: 0.88 ratio               Urinalysis Basic - ( 19 Dec 2023 06:42 )    Color: x / Appearance: x / SG: x / pH: x  Gluc: 106 mg/dL / Ketone: x  / Bili: x / Urobili: x   Blood: x / Protein: x / Nitrite: x   Leuk Esterase: x / RBC: x / WBC x   Sq Epi: x / Non Sq Epi: x / Bacteria: x                RADIOLOGY & ADDITIONAL STUDIES (The following images were personally reviewed): Pt seen and examined for chief complaint of abnormal LFT's    Pt. seen this AM. Status post IR directed liver biopsy yesterday which she appears to have tolerated well w/o post procedure c/o abdominal pain. MRI/ MRCP negative for hepatobiliary pathology. No GI complaints offered this AM. LFT's from today are roughly unchanged. BR remains at roughly 11.    REVIEW OF SYSTEMS:  Constitutional: No fever, weight loss or fatigue  Cardiovascular: No chest pain, palpitations, dizziness or leg swelling  Gastrointestinal: No abdominal or epigastric pain. No nausea, vomiting or hematemesis; No diarrhea or constipation. No melena or hematochezia.  Skin: No itching, burning, rashes or lesions   Remainder of 14 point ROS: Negative.      MEDICATIONS:  MEDICATIONS  (STANDING):  influenza   Vaccine 0.5 milliLiter(s) IntraMuscular once  nystatin Powder 1 Application(s) Topical two times a day    MEDICATIONS  (PRN):  aluminum hydroxide/magnesium hydroxide/simethicone Suspension 30 milliLiter(s) Oral every 4 hours PRN Dyspepsia  hydrOXYzine hydrochloride 25 milliGRAM(s) Oral four times a day PRN Itching  melatonin 3 milliGRAM(s) Oral at bedtime PRN Insomnia  ondansetron Injectable 4 milliGRAM(s) IV Push every 8 hours PRN Nausea and/or Vomiting      Allergies    tetracycline (Hives)    Intolerances        Vital Signs Last 24 Hrs  T(C): 36.8 (19 Dec 2023 04:55), Max: 36.8 (18 Dec 2023 20:34)  T(F): 98.2 (19 Dec 2023 04:55), Max: 98.2 (18 Dec 2023 20:34)  HR: 69 (19 Dec 2023 04:55) (69 - 83)  BP: 112/59 (19 Dec 2023 04:55) (103/72 - 124/79)  BP(mean): --  RR: 18 (19 Dec 2023 04:55) (18 - 18)  SpO2: 96% (19 Dec 2023 04:55) (95% - 97%)    Parameters below as of 19 Dec 2023 04:55  Patient On (Oxygen Delivery Method): room air          PHYSICAL EXAM:    General: Well developed; in no acute distress  HEENT: MMM, conjunctiva pink and sclera icteric.  Lungs: clear to auscultation bilaterally.  Cor: RRR S1, S2 only.  Gastrointestinal: Abdomen: Soft non-tender non-distended; Normal bowel sounds; No hepatosplenomegaly.  Extremities: no cyanosis, clubbing or edema.  Skin: Warm and dry. No obvious rash  Neuro: Pt. a + o x 3.    LABS:  CBC Full  -  ( 19 Dec 2023 06:42 )  WBC Count : 4.49 K/uL  RBC Count : 4.14 M/uL  Hemoglobin : 11.7 g/dL  Hematocrit : 34.2 %  Platelet Count - Automated : 327 K/uL  Mean Cell Volume : 82.6 fl  Mean Cell Hemoglobin : 28.3 pg  Mean Cell Hemoglobin Concentration : 34.2 gm/dL  Auto Neutrophil # : x  Auto Lymphocyte # : x  Auto Monocyte # : x  Auto Eosinophil # : x  Auto Basophil # : x  Auto Neutrophil % : x  Auto Lymphocyte % : x  Auto Monocyte % : x  Auto Eosinophil % : x  Auto Basophil % : x    12-19    138  |  103  |  9.0  ----------------------------<  106<H>  3.8   |  20.0<L>  |  0.42<L>    Ca    9.3      19 Dec 2023 06:42    TPro  6.3<L>  /  Alb  3.6  /  TBili  11.2<H>  /  DBili  x   /  AST  65<H>  /  ALT  75<H>  /  AlkPhos  231<H>  12-19    PT/INR - ( 19 Dec 2023 06:42 )   PT: 9.8 sec;   INR: 0.88 ratio               Urinalysis Basic - ( 19 Dec 2023 06:42 )    Color: x / Appearance: x / SG: x / pH: x  Gluc: 106 mg/dL / Ketone: x  / Bili: x / Urobili: x   Blood: x / Protein: x / Nitrite: x   Leuk Esterase: x / RBC: x / WBC x   Sq Epi: x / Non Sq Epi: x / Bacteria: x                RADIOLOGY & ADDITIONAL STUDIES (The following images were personally reviewed):< from: MR MRCP No Cont (12.16.23 @ 22:18) >    ACC: [id number] EXAM:  MR MRCP   ORDERED BY: YAIMA SANDOVAL     PROCEDURE DATE:  12/16/2023          INTERPRETATION:  CLINICAL INFORMATION: Jaundice. Worsening bilirubin.    COMPARISON: CT abdomen pelvis December 7, 2023.    CONTRAST/COMPLICATIONS:  IV Contrast: NONE  Oral Contrast: NONE  Complications: None reported at time of study completion    PROCEDURE:  MRI of the abdomen was performed.  MRCP was performed.    FINDINGS:  LOWER CHEST: Within normal limits.    LIVER: Within normal limits.  BILE DUCTS: Normal caliber. Normal course and contour. No filling   defects/choledocholithiasis.  GALLBLADDER: Within normal limits. No gallstones.  SPLEEN: Within normal limits.  PANCREAS: Within normal limits.  ADRENALS: Within normal limits.  KIDNEYS/URETERS: Within normal limits.    VISUALIZED PORTIONS:  BOWEL: No bowel obstruction.  PERITONEUM: No ascites.  VESSELS: Within normal limits.  RETROPERITONEUM/LYMPH NODES: No lymphadenopathy.  ABDOMINAL WALL: Within normal limits.  BONES: Within normal limits.    IMPRESSION:  Unremarkable MRI/MRCP.        --- End of Report ---            JH MURILLO MD; Attending Radiologist  This document has been electronically signed. Dec 17 2023  8:49AM    < end of copied text >   Pt seen and examined for chief complaint of abnormal LFT's    Pt. seen this AM. Status post IR directed liver biopsy yesterday which she appears to have tolerated well w/o post procedure c/o abdominal pain. MRI/ MRCP negative for hepatobiliary pathology. No GI complaints offered this AM. LFT's from today are roughly unchanged. BR remains at roughly 11.    REVIEW OF SYSTEMS:  Constitutional: No fever, weight loss or fatigue  Cardiovascular: No chest pain, palpitations, dizziness or leg swelling  Gastrointestinal: No abdominal or epigastric pain. No nausea, vomiting or hematemesis; No diarrhea or constipation. No melena or hematochezia.  Skin: No itching, burning, rashes or lesions   Remainder of 14 point ROS: Negative.      MEDICATIONS:  MEDICATIONS  (STANDING):  influenza   Vaccine 0.5 milliLiter(s) IntraMuscular once  nystatin Powder 1 Application(s) Topical two times a day    MEDICATIONS  (PRN):  aluminum hydroxide/magnesium hydroxide/simethicone Suspension 30 milliLiter(s) Oral every 4 hours PRN Dyspepsia  hydrOXYzine hydrochloride 25 milliGRAM(s) Oral four times a day PRN Itching  melatonin 3 milliGRAM(s) Oral at bedtime PRN Insomnia  ondansetron Injectable 4 milliGRAM(s) IV Push every 8 hours PRN Nausea and/or Vomiting      Allergies    tetracycline (Hives)    Intolerances        Vital Signs Last 24 Hrs  T(C): 36.8 (19 Dec 2023 04:55), Max: 36.8 (18 Dec 2023 20:34)  T(F): 98.2 (19 Dec 2023 04:55), Max: 98.2 (18 Dec 2023 20:34)  HR: 69 (19 Dec 2023 04:55) (69 - 83)  BP: 112/59 (19 Dec 2023 04:55) (103/72 - 124/79)  BP(mean): --  RR: 18 (19 Dec 2023 04:55) (18 - 18)  SpO2: 96% (19 Dec 2023 04:55) (95% - 97%)    Parameters below as of 19 Dec 2023 04:55  Patient On (Oxygen Delivery Method): room air          PHYSICAL EXAM:    General: Well developed; in no acute distress  HEENT: MMM, conjunctiva pink and sclera icteric.  Lungs: clear to auscultation bilaterally.  Cor: RRR S1, S2 only.  Gastrointestinal: Abdomen: Soft non-tender non-distended; Normal bowel sounds; No hepatosplenomegaly.  Extremities: no cyanosis, clubbing or edema.  Skin: Warm and dry. No obvious rash  Neuro: Pt. a + o x 3.    LABS:  CBC Full  -  ( 19 Dec 2023 06:42 )  WBC Count : 4.49 K/uL  RBC Count : 4.14 M/uL  Hemoglobin : 11.7 g/dL  Hematocrit : 34.2 %  Platelet Count - Automated : 327 K/uL  Mean Cell Volume : 82.6 fl  Mean Cell Hemoglobin : 28.3 pg  Mean Cell Hemoglobin Concentration : 34.2 gm/dL  Auto Neutrophil # : x  Auto Lymphocyte # : x  Auto Monocyte # : x  Auto Eosinophil # : x  Auto Basophil # : x  Auto Neutrophil % : x  Auto Lymphocyte % : x  Auto Monocyte % : x  Auto Eosinophil % : x  Auto Basophil % : x    12-19    138  |  103  |  9.0  ----------------------------<  106<H>  3.8   |  20.0<L>  |  0.42<L>    Ca    9.3      19 Dec 2023 06:42    TPro  6.3<L>  /  Alb  3.6  /  TBili  11.2<H>  /  DBili  x   /  AST  65<H>  /  ALT  75<H>  /  AlkPhos  231<H>  12-19    PT/INR - ( 19 Dec 2023 06:42 )   PT: 9.8 sec;   INR: 0.88 ratio               Urinalysis Basic - ( 19 Dec 2023 06:42 )    Color: x / Appearance: x / SG: x / pH: x  Gluc: 106 mg/dL / Ketone: x  / Bili: x / Urobili: x   Blood: x / Protein: x / Nitrite: x   Leuk Esterase: x / RBC: x / WBC x   Sq Epi: x / Non Sq Epi: x / Bacteria: x                RADIOLOGY & ADDITIONAL STUDIES (The following images were personally reviewed):< from: MR MRCP No Cont (12.16.23 @ 22:18) >    ACC: [id number] EXAM:  MR MRCP   ORDERED BY: YAIMA SANDOVAL     PROCEDURE DATE:  12/16/2023          INTERPRETATION:  CLINICAL INFORMATION: Jaundice. Worsening bilirubin.    COMPARISON: CT abdomen pelvis December 7, 2023.    CONTRAST/COMPLICATIONS:  IV Contrast: NONE  Oral Contrast: NONE  Complications: None reported at time of study completion    PROCEDURE:  MRI of the abdomen was performed.  MRCP was performed.    FINDINGS:  LOWER CHEST: Within normal limits.    LIVER: Within normal limits.  BILE DUCTS: Normal caliber. Normal course and contour. No filling   defects/choledocholithiasis.  GALLBLADDER: Within normal limits. No gallstones.  SPLEEN: Within normal limits.  PANCREAS: Within normal limits.  ADRENALS: Within normal limits.  KIDNEYS/URETERS: Within normal limits.    VISUALIZED PORTIONS:  BOWEL: No bowel obstruction.  PERITONEUM: No ascites.  VESSELS: Within normal limits.  RETROPERITONEUM/LYMPH NODES: No lymphadenopathy.  ABDOMINAL WALL: Within normal limits.  BONES: Within normal limits.    IMPRESSION:  Unremarkable MRI/MRCP.        --- End of Report ---            JH MURILLO MD; Attending Radiologist  This document has been electronically signed. Dec 17 2023  8:49AM    < end of copied text >

## 2023-12-19 NOTE — PROGRESS NOTE ADULT - PROVIDER SPECIALTY LIST ADULT
Intervent Radiology
Hospitalist
Hospitalist
Internal Medicine
Gastroenterology
Hospitalist
Gastroenterology
Gastroenterology

## 2023-12-19 NOTE — PROGRESS NOTE ADULT - ASSESSMENT
58 yr old female with no known medical history, recently admitted for transaminitis was sent in for evaluation by her hepatologist for MRCP and liver biopsy. Noted to have recent worsening bilirubin. Work up done recently revealed early EBV Ag positive. GI evaluation was requested. MRCP was ordered, revealed no obstruction. IR consulted for liver biopsy.    1. Hyperbilirubinemia:  MRCP with no obstruction.  GI following  Monitor LFTs.  s/p liver biopsy    2. Abnormal lipid profile:  Elevated TG and Cholesterol  patient reports normal lipid panel last year, no family history of hyperlipidemia  follow up outpatient.    3. DVT ppx:  SCDs    Discussed with patient and GI.   Discharge home today.

## 2023-12-19 NOTE — PROGRESS NOTE ADULT - SUBJECTIVE AND OBJECTIVE BOX
INTERVAL HPI/OVERNIGHT EVENTS:    CC: hyperbilirubinemia, abnormal lipid panel    No overnight events, denies complaints    Vital Signs Last 24 Hrs  T(C): 36.6 (19 Dec 2023 10:35), Max: 36.8 (18 Dec 2023 20:34)  T(F): 97.8 (19 Dec 2023 10:35), Max: 98.2 (18 Dec 2023 20:34)  HR: 79 (19 Dec 2023 10:35) (69 - 83)  BP: 113/68 (19 Dec 2023 10:35) (103/72 - 124/79)  BP(mean): --  RR: 18 (19 Dec 2023 10:35) (18 - 18)  SpO2: 96% (19 Dec 2023 10:35) (95% - 97%)    Parameters below as of 19 Dec 2023 10:35  Patient On (Oxygen Delivery Method): room air        PHYSICAL EXAM:    GENERAL: alert, not in distress, icterus  CHEST/LUNG: b/l air entry  HEART: reg  ABDOMEN: soft, bs+  EXTREMITIES:  no edema, tenderness    MEDICATIONS  (STANDING):  influenza   Vaccine 0.5 milliLiter(s) IntraMuscular once  nystatin Powder 1 Application(s) Topical two times a day    MEDICATIONS  (PRN):  aluminum hydroxide/magnesium hydroxide/simethicone Suspension 30 milliLiter(s) Oral every 4 hours PRN Dyspepsia  hydrOXYzine hydrochloride 25 milliGRAM(s) Oral four times a day PRN Itching  melatonin 3 milliGRAM(s) Oral at bedtime PRN Insomnia  ondansetron Injectable 4 milliGRAM(s) IV Push every 8 hours PRN Nausea and/or Vomiting      Allergies    tetracycline (Hives)    Intolerances          LABS:                          11.7   4.49  )-----------( 327      ( 19 Dec 2023 06:42 )             34.2     12-19    138  |  103  |  9.0  ----------------------------<  106<H>  3.8   |  20.0<L>  |  0.42<L>    Ca    9.3      19 Dec 2023 06:42    TPro  6.3<L>  /  Alb  3.6  /  TBili  11.2<H>  /  DBili  x   /  AST  65<H>  /  ALT  75<H>  /  AlkPhos  231<H>  12-19    PT/INR - ( 19 Dec 2023 06:42 )   PT: 9.8 sec;   INR: 0.88 ratio           Urinalysis Basic - ( 19 Dec 2023 06:42 )    Color: x / Appearance: x / SG: x / pH: x  Gluc: 106 mg/dL / Ketone: x  / Bili: x / Urobili: x   Blood: x / Protein: x / Nitrite: x   Leuk Esterase: x / RBC: x / WBC x   Sq Epi: x / Non Sq Epi: x / Bacteria: x        RADIOLOGY & ADDITIONAL TESTS:

## 2023-12-19 NOTE — PROGRESS NOTE ADULT - NUTRITIONAL ASSESSMENT
This patient has been assessed with a concern for Malnutrition and has been determined to have a diagnosis/diagnoses of Severe protein-calorie malnutrition.    This patient is being managed with:   Diet Regular-  Entered: Dec 16 2023 12:26AM

## 2023-12-19 NOTE — DISCHARGE NOTE NURSING/CASE MANAGEMENT/SOCIAL WORK - NSDCPEFALRISK_GEN_ALL_CORE
For information on Fall & Injury Prevention, visit: [email or web address] OR  [email or web address] OR  https://www.cdc.gov/steadi/patient.html For information on Fall & Injury Prevention, visit: [email or web address]/[email or web address] OR  [email or web address]/[email or web address] OR  https://www.cdc.gov/steadi/patient.html

## 2023-12-19 NOTE — DISCHARGE NOTE NURSING/CASE MANAGEMENT/SOCIAL WORK - PATIENT PORTAL LINK FT
You can access the FollowMyHealth Patient Portal offered by [facility] by registering at the following website: [email or web address]. By joining [facility]’s FollowMyHealth portal, you will also be able to view your health information using other applications (apps) compatible with our system. You can access the FollowMyHealth Patient Portal offered by [facility] by registering at the following website: [email or web address]. By joining [facility]’s FollowMyHealth portal, you will also be able to view your health information using other applications (apps) compatible with our system.

## 2023-12-19 NOTE — PROGRESS NOTE ADULT - PROBLEM SELECTOR PLAN 1
Etiology unknown. No evidence of hepatobiliary pathology on imaging studies. Liver biopsy done yesterday. Pt. can be discharged home today from a GI standpoint with OPT f/u with Dr. Chacko our hepatologist for liver biopsy results in 1 to 2 weeks. Signing off. Reconsult in house as needed. Thank you.
Patient with normal MRI  Liver biopsy today   slight decreased in LFT  check LFT in am , then D/C home in am  plan discussed with hospitalist
Etiology unknown. All serologies negative and MRI/ MRCP negative for hepatobiliary pathology. Will need liver biopsy. Will order and discuss with IR tomorrow. Hopefully it can be done tomorrow. NPO after MN tonight for possible liver biopsy tomorrow. Repeat labs ordered for the AM.

## 2023-12-19 NOTE — PROGRESS NOTE ADULT - REASON FOR ADMISSION
Increase in bilirubin

## 2023-12-29 ENCOUNTER — NON-APPOINTMENT (OUTPATIENT)
Age: 58
End: 2023-12-29

## 2023-12-29 ENCOUNTER — LABORATORY RESULT (OUTPATIENT)
Age: 58
End: 2023-12-29

## 2023-12-29 LAB
SURGICAL PATHOLOGY STUDY: SIGNIFICANT CHANGE UP
SURGICAL PATHOLOGY STUDY: SIGNIFICANT CHANGE UP

## 2024-01-02 ENCOUNTER — TRANSCRIPTION ENCOUNTER (OUTPATIENT)
Age: 59
End: 2024-01-02

## 2024-01-03 ENCOUNTER — LABORATORY RESULT (OUTPATIENT)
Age: 59
End: 2024-01-03

## 2024-01-04 ENCOUNTER — APPOINTMENT (OUTPATIENT)
Dept: GASTROENTEROLOGY | Facility: CLINIC | Age: 59
End: 2024-01-04
Payer: COMMERCIAL

## 2024-01-04 VITALS
HEART RATE: 85 BPM | WEIGHT: 130 LBS | SYSTOLIC BLOOD PRESSURE: 117 MMHG | OXYGEN SATURATION: 99 % | HEIGHT: 65 IN | BODY MASS INDEX: 21.66 KG/M2 | DIASTOLIC BLOOD PRESSURE: 80 MMHG | RESPIRATION RATE: 14 BRPM

## 2024-01-04 PROBLEM — R17 UNSPECIFIED JAUNDICE: Chronic | Status: ACTIVE | Noted: 2023-12-16

## 2024-01-04 PROCEDURE — 99215 OFFICE O/P EST HI 40 MIN: CPT

## 2024-01-04 NOTE — HISTORY OF PRESENT ILLNESS
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 Initially admitted to [facility] (12/06- 12/09) with painless jaundice Bili 7 (mostly direct) and mild ALP and transaminitis. Unremarkable MRI/MRCP and CT.Patent Hepatic and Portal Veins.CT Her  initial T bili/ AST/ ALT was 7.0/ 110/163,  on  (12/09) T Bili 13, AST/ KEQ135/83, . Latest BILI 17(direct 15, indirect 2.5) Normal INR, no thrombocytopenia.  Patient reported  generalized weakness. No other symptoms. None. ABCE Serologies neg. CMV EBV Serologies positive for exposure but viral loads neg VZV, HSV ,Syphilis, HIV -All nEG Autoimmune etc neg Liver biopsy suggestive of drug-induced/herbal induced liver injury.  Symptomatically better today.  I have very strongly advised her to refrain from taking any herbal products. Labs results prognosis discussed extensively with the patient and her  and her mother who is an RN  Liver Biopsy : Cholestasis- quite a bit pocket of steroid laden macrophages which indicate liver injury Portal tract inflammation.rare eos, rare plasma cells.rare luymphocytes. No interface hepatitis lobular inflammation -mild. Bile duct reactive mild No Hep Artery damage seen. No fibrosis    Hospital Course: Discharge Date	09-Dec-2023 Admission Date	07-Dec-2023 05:03 Reason for Admission	Elevated LFTs Hospital Course	 57 y/o F with no known PMH admitted with painless jaundice. No definitive etiology for hepatitis identified thus far. Has been treated empirically with NAC and given stable LFTs is planned for [facility] home wiuth outpt f/u with PCP and GI   Med Reconciliation: Medication Reconciliation Status	Admission Reconciliation is Completed Discharge Reconciliation is Completed Discharge Medications	hydrOXYzine hydrochloride 25 mg oral tablet: 1 tab(s) orally every 8 hours as needed for Itching ,  LFTs remain elevated but stable, INR normal Positive EBV titers, possible EBV reactivation Additional labs still pending Recent viral illness Herbal supplements (goldenrod), forages for mushrooms (lion's frances and reishi) CT A/P 12/7/23- no acute findings- normal liver, normal caliber bile ducts Acute hepatitis panel negative NAC completed, Ok to d/c with follow up in office with Dr. Chacko   ACC: [id number]     EXAM:  CT ABDOMEN AND PELVIS IC   ORDERED BY: JOSÉ MASSEY  PROCEDURE DATE:  12/07/2023    INTERPRETATION:  CLINICAL INFORMATION: 58 years old. Female. jaundice.  COMPARISON: None.  CONTRAST/COMPLICATIONS: IV Contrast: Omnipaque 350  90 cc administered  10 cc discarded. Oral Contrast: NONE Complications: None reported at time of study completion  PROCEDURE: CT of the Abdomen and Pelvis was performed. Sagittal and coronal reformats were performed.  FINDINGS:  LOWER CHEST: Within normal limits.  LIVER: Within normal limits. BILE DUCTS: Normal caliber. GALLBLADDER: Within normal limits. SPLEEN: Within normal limits. PANCREAS: Within normal limits. ADRENALS: Within normal limits. KIDNEYS/URETERS: Enhance symmetrically. No hydronephrosis. A subcentimeter hypodensity in the left kidney is too small to characterize.  BLADDER: Within normal limits. REPRODUCTIVE ORGANS: Uterus and adnexa are grossly unremarkable.  BOWEL: No bowel obstruction. Appendix is unremarkable. PERITONEUM: No ascites. VESSELS: Normal caliber abdominal aorta. RETROPERITONEUM/LYMPH NODES: No lymphadenopathy. ABDOMINAL WALL: Within normal limits. BONES: Mild degenerative changes in the spine.  IMPRESSION:  No acute CT findings in the abdomen or pelvis. No CT findings to explain jaundice   Physical exam: Gen: A&Ox3, NAD HEENT: normal outer ears & nose, PERRL, mucus membranes moist, anicteric, no lymphadenopathy CVS: regular rate and rhythm, no murmur Pulm: vesicular breath sounds Abdomen: normal bowel sounds, soft, nontender, no hepatosplenomegaly Legs: no edema, no clubbing Musculoskeletal: normal gait Skin: no rash Neuro: no asterixis, EOMI Psych: normal affect

## 2024-01-04 NOTE — ASSESSMENT
[FreeTextEntry1] : Herbal/mushroom induced liver injury. Symptomatically better and downtrending enzymes. Bilirubin down to 9.  INR stable. Repeat labs in a week.  No intervention at this time.  In case LFTs start trending up consider steroids. Continue ursodiol for now. Follow-up in a month with labs. However if next weeks labs are going up then we will see her sooner.

## 2024-01-05 LAB
AFP-TM SERPL-MCNC: 1.9 NG/ML
ALBUMIN SERPL ELPH-MCNC: 4.2 G/DL
ALP BLD-CCNC: 193 U/L
ALT SERPL-CCNC: 48 U/L
ANION GAP SERPL CALC-SCNC: 13 MMOL/L
AST SERPL-CCNC: 60 U/L
BASOPHILS # BLD AUTO: 0.08 K/UL
BASOPHILS NFR BLD AUTO: 1.6 %
BILIRUB INDIRECT SERPL-MCNC: 2.4 MG/DL
BILIRUB SERPL-MCNC: 9.3 MG/DL
BUN SERPL-MCNC: 9 MG/DL
CALCIUM SERPL-MCNC: 10.2 MG/DL
CHLORIDE SERPL-SCNC: 99 MMOL/L
CO2 SERPL-SCNC: 24 MMOL/L
CREAT SERPL-MCNC: 0.74 MG/DL
EGFR: 94 ML/MIN/1.73M2
EOSINOPHIL # BLD AUTO: 0.27 K/UL
EOSINOPHIL NFR BLD AUTO: 5.4 %
GLUCOSE SERPL-MCNC: 95 MG/DL
HCT VFR BLD CALC: 34.2 %
HGB BLD-MCNC: 11.5 G/DL
IMM GRANULOCYTES NFR BLD AUTO: 0.6 %
INR PPP: 0.88 RATIO
LYMPHOCYTES # BLD AUTO: 1.29 K/UL
LYMPHOCYTES NFR BLD AUTO: 26 %
MAN DIFF?: NORMAL
MCHC RBC-ENTMCNC: 29.7 PG
MCHC RBC-ENTMCNC: 33.6 GM/DL
MCV RBC AUTO: 88.4 FL
MONOCYTES # BLD AUTO: 0.61 K/UL
MONOCYTES NFR BLD AUTO: 12.3 %
NEUTROPHILS # BLD AUTO: 2.68 K/UL
NEUTROPHILS NFR BLD AUTO: 54.1 %
PLATELET # BLD AUTO: 337 K/UL
POTASSIUM SERPL-SCNC: 4.9 MMOL/L
PROT SERPL-MCNC: 6.6 G/DL
PT BLD: 10 SEC
RBC # BLD: 3.87 M/UL
RBC # FLD: 18.4 %
SODIUM SERPL-SCNC: 136 MMOL/L
WBC # FLD AUTO: 4.96 K/UL

## 2024-01-08 ENCOUNTER — LABORATORY RESULT (OUTPATIENT)
Age: 59
End: 2024-01-08

## 2024-01-09 ENCOUNTER — NON-APPOINTMENT (OUTPATIENT)
Age: 59
End: 2024-01-09

## 2024-01-09 LAB
AFP-TM SERPL-MCNC: 2 NG/ML
ALBUMIN SERPL ELPH-MCNC: 3.9 G/DL
ALP BLD-CCNC: 164 U/L
ALT SERPL-CCNC: 55 U/L
ANION GAP SERPL CALC-SCNC: 12 MMOL/L
AST SERPL-CCNC: 70 U/L
BASOPHILS # BLD AUTO: 0.09 K/UL
BASOPHILS NFR BLD AUTO: 1.7 %
BILIRUB INDIRECT SERPL-MCNC: 1.5 MG/DL
BILIRUB SERPL-MCNC: 6.8 MG/DL
BUN SERPL-MCNC: 10 MG/DL
CALCIUM SERPL-MCNC: 9.5 MG/DL
CHLORIDE SERPL-SCNC: 102 MMOL/L
CO2 SERPL-SCNC: 25 MMOL/L
CREAT SERPL-MCNC: 0.76 MG/DL
EGFR: 91 ML/MIN/1.73M2
EOSINOPHIL # BLD AUTO: 0.21 K/UL
EOSINOPHIL NFR BLD AUTO: 3.9 %
GLUCOSE SERPL-MCNC: 102 MG/DL
HCT VFR BLD CALC: 34.9 %
HGB BLD-MCNC: 11.3 G/DL
IMM GRANULOCYTES NFR BLD AUTO: 0.7 %
INR PPP: 0.9 RATIO
LYMPHOCYTES # BLD AUTO: 1.03 K/UL
LYMPHOCYTES NFR BLD AUTO: 18.9 %
MAN DIFF?: NORMAL
MCHC RBC-ENTMCNC: 29.4 PG
MCHC RBC-ENTMCNC: 32.4 GM/DL
MCV RBC AUTO: 90.9 FL
MONOCYTES # BLD AUTO: 0.54 K/UL
MONOCYTES NFR BLD AUTO: 9.9 %
NEUTROPHILS # BLD AUTO: 3.54 K/UL
NEUTROPHILS NFR BLD AUTO: 64.9 %
PLATELET # BLD AUTO: 351 K/UL
POTASSIUM SERPL-SCNC: 4.8 MMOL/L
PROT SERPL-MCNC: 6.3 G/DL
PT BLD: 10.2 SEC
RBC # BLD: 3.84 M/UL
RBC # FLD: 17.3 %
SODIUM SERPL-SCNC: 139 MMOL/L
WBC # FLD AUTO: 5.45 K/UL

## 2024-01-10 ENCOUNTER — APPOINTMENT (OUTPATIENT)
Dept: GASTROENTEROLOGY | Facility: CLINIC | Age: 59
End: 2024-01-10
Payer: COMMERCIAL

## 2024-01-10 VITALS
SYSTOLIC BLOOD PRESSURE: 120 MMHG | BODY MASS INDEX: 24.49 KG/M2 | DIASTOLIC BLOOD PRESSURE: 65 MMHG | RESPIRATION RATE: 16 BRPM | WEIGHT: 147 LBS | HEIGHT: 65 IN | OXYGEN SATURATION: 98 % | HEART RATE: 70 BPM

## 2024-01-10 DIAGNOSIS — R12 HEARTBURN: ICD-10-CM

## 2024-01-10 DIAGNOSIS — Z02.89 ENCOUNTER FOR OTHER ADMINISTRATIVE EXAMINATIONS: ICD-10-CM

## 2024-01-10 DIAGNOSIS — Z09 ENCOUNTER FOR FOLLOW-UP EXAMINATION AFTER COMPLETED TREATMENT FOR CONDITIONS OTHER THAN MALIGNANT NEOPLASM: ICD-10-CM

## 2024-01-10 PROCEDURE — 99214 OFFICE O/P EST MOD 30 MIN: CPT

## 2024-01-10 NOTE — ASSESSMENT
[FreeTextEntry1] : Ms. Schulz is a 58-year-old woman who presents to the gastroenterology office for follow-up after hospital discharge.  Significant elevated liver chemistries in the setting of likely toxic mushroom exposure improving dramatically.  Patient currently followed closely by hepatology.  General health maintenance appears to be up-to-date.  Patient without alarming features.  Patient without family history of gastrointestinal malignancy.  No acute intervention warranted at this time.

## 2024-01-10 NOTE — PHYSICAL EXAM
[Alert] : alert [Normal Voice/Communication] : normal voice/communication [No Acute Distress] : no acute distress [Hearing Threshold Finger Rub Not [place]] : hearing was normal [Normal Appearance] : the appearance of the neck was normal [No Respiratory Distress] : no respiratory distress [No Acc Muscle Use] : no accessory muscle use [Respiration, Rhythm And Depth] : normal respiratory rhythm and effort [Auscultation Breath Sounds / Voice Sounds] : lungs were clear to auscultation bilaterally [Heart Rate And Rhythm] : heart rate was normal and rhythm regular [None] : no edema [Bowel Sounds] : normal bowel sounds [Abdomen Tenderness] : non-tender [No Masses] : no abdominal mass palpated [Abdomen Soft] : soft [] : no hepatosplenomegaly [Abnormal Walk] : normal gait [Oriented To Time, Place, And Person] : oriented to person, place, and time [FreeTextEntry1] : Mild scleral icterus [de-identified] : Mild sublingual icterus [de-identified] : Jaundice improved [de-identified] : No asterixis

## 2024-01-10 NOTE — PLAN
[TextEntry] : Would continue to follow-up with hepatology in terms of liver chemistry trends. Would continue ursodiol as recommended by hepatology. Encouraged abstinence from any herbal or home remedies as previously stated. Will plan for age-appropriate colorectal cancer screening in 7 to 10 years per guideline recommendations based on previously negative colonoscopy. Can follow-up with gastroenterology as needed.

## 2024-01-16 NOTE — ED ADULT NURSE NOTE - ISOLATION TYPE:
1. Continue infusions every 8 weeks  2. Will check level with February infusion  3. Let me know about college decision  4. Follow up in 4 months    None

## 2024-02-03 ENCOUNTER — LABORATORY RESULT (OUTPATIENT)
Age: 59
End: 2024-02-03

## 2024-02-23 LAB
AFP-TM SERPL-MCNC: 3.7 NG/ML
ALBUMIN SERPL ELPH-MCNC: 4.5 G/DL
ALP BLD-CCNC: 144 U/L
ALT SERPL-CCNC: 58 U/L
ANION GAP SERPL CALC-SCNC: 15 MMOL/L
AST SERPL-CCNC: 56 U/L
BASOPHILS # BLD AUTO: 0.07 K/UL
BASOPHILS NFR BLD AUTO: 1.1 %
BILIRUB INDIRECT SERPL-MCNC: 0.9 MG/DL
BILIRUB SERPL-MCNC: 2.3 MG/DL
BUN SERPL-MCNC: 11 MG/DL
CALCIUM SERPL-MCNC: 9.9 MG/DL
CHLORIDE SERPL-SCNC: 103 MMOL/L
CO2 SERPL-SCNC: 24 MMOL/L
CREAT SERPL-MCNC: 0.68 MG/DL
EGFR: 101 ML/MIN/1.73M2
EOSINOPHIL # BLD AUTO: 0.13 K/UL
EOSINOPHIL NFR BLD AUTO: 2 %
GLUCOSE SERPL-MCNC: 101 MG/DL
HCT VFR BLD CALC: 35.3 %
HGB BLD-MCNC: 11.8 G/DL
IMM GRANULOCYTES NFR BLD AUTO: 0.3 %
INR PPP: 0.9 RATIO
LYMPHOCYTES # BLD AUTO: 1.26 K/UL
LYMPHOCYTES NFR BLD AUTO: 19.6 %
MAN DIFF?: NORMAL
MCHC RBC-ENTMCNC: 30.1 PG
MCHC RBC-ENTMCNC: 33.4 GM/DL
MCV RBC AUTO: 90.1 FL
MONOCYTES # BLD AUTO: 0.5 K/UL
MONOCYTES NFR BLD AUTO: 7.8 %
NEUTROPHILS # BLD AUTO: 4.46 K/UL
NEUTROPHILS NFR BLD AUTO: 69.2 %
PLATELET # BLD AUTO: 365 K/UL
POTASSIUM SERPL-SCNC: 5.1 MMOL/L
PROT SERPL-MCNC: 6.9 G/DL
PT BLD: 10.2 SEC
RBC # BLD: 3.92 M/UL
RBC # FLD: 15.9 %
SODIUM SERPL-SCNC: 142 MMOL/L
WBC # FLD AUTO: 6.44 K/UL

## 2024-02-27 ENCOUNTER — APPOINTMENT (OUTPATIENT)
Dept: GASTROENTEROLOGY | Facility: CLINIC | Age: 59
End: 2024-02-27
Payer: COMMERCIAL

## 2024-02-27 VITALS
RESPIRATION RATE: 16 BRPM | SYSTOLIC BLOOD PRESSURE: 110 MMHG | HEART RATE: 73 BPM | HEIGHT: 65 IN | BODY MASS INDEX: 23.99 KG/M2 | DIASTOLIC BLOOD PRESSURE: 67 MMHG | WEIGHT: 144 LBS | OXYGEN SATURATION: 98 %

## 2024-02-27 DIAGNOSIS — R17 UNSPECIFIED JAUNDICE: ICD-10-CM

## 2024-02-27 PROCEDURE — 99214 OFFICE O/P EST MOD 30 MIN: CPT

## 2024-02-28 NOTE — HISTORY OF PRESENT ILLNESS
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 Initially admitted to [facility] (12/06- 12/09) with painless jaundice Bili 7 (mostly direct) and mild ALP and transaminitis. Unremarkable MRI/MRCP and CT.Patent Hepatic and Portal Veins.CT Her  initial T bili/ AST/ ALT was 7.0/ 110/163,  on  (12/09) T Bili 13, AST/ ULX278/83, . Latest BILI 17(direct 15, indirect 2.5) Normal INR, no thrombocytopenia.  Patient reported  generalized weakness. No other symptoms. None. ABCE Serologies neg. CMV EBV Serologies positive for exposure but viral loads neg VZV, HSV ,Syphilis, HIV -All nEG Autoimmune etc neg Liver biopsy suggestive of drug-induced/herbal induced liver injury.  2/28/2024 58-year-old patient with herbal medication induced liver failure(now resolved) presents for follow-up. Records reviewed, including notes, labs, imaging, etc. No new liver-related complaints like hematemesis melena jaundice. No hospitalizations or medication changes. Previsit labs reviewed.  They were done on 3 February 2024 Bilirubin 2.3 from a previous of more than 17 Resolved liver failure. No symptoms     1/4/2024 Symptomatically better today.  I have very strongly advised her to refrain from taking any herbal products. Labs results prognosis discussed extensively with the patient and her  and her mother who is an RN  Liver Biopsy : Cholestasis- quite a bit pocket of steroid laden macrophages which indicate liver injury Portal tract inflammation.rare eos, rare plasma cells.rare luymphocytes. No interface hepatitis lobular inflammation -mild. Bile duct reactive mild No Hep Artery damage seen. No fibrosis    Hospital Course: Discharge Date	09-Dec-2023 Admission Date	07-Dec-2023 05:03 Reason for Admission	Elevated LFTs Hospital Course	 59 y/o F with no known PMH admitted with painless jaundice. No definitive etiology for hepatitis identified thus far. Has been treated empirically with NAC and given stable LFTs is planned for [facility] home wiuth outpt f/u with PCP and GI   Med Reconciliation: Medication Reconciliation Status	Admission Reconciliation is Completed Discharge Reconciliation is Completed Discharge Medications	hydrOXYzine hydrochloride 25 mg oral tablet: 1 tab(s) orally every 8 hours as needed for Itching ,  LFTs remain elevated but stable, INR normal Positive EBV titers, possible EBV reactivation Additional labs still pending Recent viral illness Herbal supplements (goldenrod), forages for mushrooms (lion's frances and reishi) CT A/P 12/7/23- no acute findings- normal liver, normal caliber bile ducts Acute hepatitis panel negative NAC completed, Ok to d/c with follow up in office with Dr. Chacko   ACC: [id number]     EXAM:  CT ABDOMEN AND PELVIS IC   ORDERED BY: JOSÉ MASSEY  PROCEDURE DATE:  12/07/2023    INTERPRETATION:  CLINICAL INFORMATION: 58 years old. Female. jaundice.  COMPARISON: None.  CONTRAST/COMPLICATIONS: IV Contrast: Omnipaque 350  90 cc administered  10 cc discarded. Oral Contrast: NONE Complications: None reported at time of study completion  PROCEDURE: CT of the Abdomen and Pelvis was performed. Sagittal and coronal reformats were performed.  FINDINGS:  LOWER CHEST: Within normal limits.  LIVER: Within normal limits. BILE DUCTS: Normal caliber. GALLBLADDER: Within normal limits. SPLEEN: Within normal limits. PANCREAS: Within normal limits. ADRENALS: Within normal limits. KIDNEYS/URETERS: Enhance symmetrically. No hydronephrosis. A subcentimeter hypodensity in the left kidney is too small to characterize.  BLADDER: Within normal limits. REPRODUCTIVE ORGANS: Uterus and adnexa are grossly unremarkable.  BOWEL: No bowel obstruction. Appendix is unremarkable. PERITONEUM: No ascites. VESSELS: Normal caliber abdominal aorta. RETROPERITONEUM/LYMPH NODES: No lymphadenopathy. ABDOMINAL WALL: Within normal limits. BONES: Mild degenerative changes in the spine.  IMPRESSION:  No acute CT findings in the abdomen or pelvis. No CT findings to explain jaundice   Physical exam: Gen: A&Ox3, NAD HEENT: normal outer ears & nose, PERRL, mucus membranes moist, anicteric, no lymphadenopathy CVS: regular rate and rhythm, no murmur Pulm: vesicular breath sounds Abdomen: normal bowel sounds, soft, nontender, no hepatosplenomegaly Legs: no edema, no clubbing Musculoskeletal: normal gait Skin: no rash Neuro: no asterixis, EOMI Psych: normal affect

## 2024-02-28 NOTE — ASSESSMENT
[FreeTextEntry1] :  Herbal/mushroom induced liver injury/liver failure   ResolvING.  Last bilirubin 2.3 INR stable and normal. Continue Pat for the next 2 to 3 weeks. Labs in 3 to 4 weeks. Follow-up in May.  Labs before follow-up.

## 2024-03-13 NOTE — ASSESSMENT
[FreeTextEntry1] : We ruled out common and uncommon etiologies of liver disease including viral hepatitis, autoimmune hepatitis, PBC, Max's, hemochromatosis, infection with non-hepatotropic viruses etc.  LFT elevation due to herbal medication induced liver injury.  Repeat labs today. If trending up may need to seek the hospital again. If trending down follow-up within a week

## 2024-03-13 NOTE — HISTORY OF PRESENT ILLNESS
[FreeTextEntry1] : A very pleasant 58 year female with no significant past medical history was sent to us for evaluation of jaundice.     Patient initially presented to the ER on 7 December and was discharged on 9 December  2 weeks prior to the ER visit she had a viral-like illness which she believed was the flu.Shortly after that she noticed that her urine was dark and that she felt very itchy all over her body and subsequently turned yellow.  Patient denies any heavy alcohol use.  Denies using excessive amounts of Tylenol.  States she does take aspirin.  Patient reports she takes herbal supplements Jovan, Lions main, goldenrod etc. and for just for mushrooms   she also noted an 8 pound weight loss over the past 1 week. Common etiologies for elevated LFTs were ruled out in the hospital and NAC was initiated.  CT A/P 12/7/23- no acute findings- normal liver, normal caliber bile ducts  Her LFTs stabilized and she was discharged home   Records reviewed, including notes, labs, imaging, etc. No new liver-related complaints like hematemesis melena. She continues to be tired/fatigue and continues to have some itching. Other than that does not endorse any other symptoms .    ACC: [id number]     EXAM:  CT ABDOMEN AND PELVIS IC   ORDERED BY: JOSÉ MASSEY  PROCEDURE DATE:  12/07/2023    INTERPRETATION:  CLINICAL INFORMATION: 58 years old. Female. jaundice.  COMPARISON: None.  CONTRAST/COMPLICATIONS: IV Contrast: Omnipaque 350  90 cc administered  10 cc discarded. Oral Contrast: NONE Complications: None reported at time of study completion  PROCEDURE: CT of the Abdomen and Pelvis was performed. Sagittal and coronal reformats were performed.  FINDINGS:  LOWER CHEST: Within normal limits.  LIVER: Within normal limits. BILE DUCTS: Normal caliber. GALLBLADDER: Within normal limits. SPLEEN: Within normal limits. PANCREAS: Within normal limits. ADRENALS: Within normal limits. KIDNEYS/URETERS: Enhance symmetrically. No hydronephrosis. A subcentimeter hypodensity in the left kidney is too small to characterize.  BLADDER: Within normal limits. REPRODUCTIVE ORGANS: Uterus and adnexa are grossly unremarkable.  BOWEL: No bowel obstruction. Appendix is unremarkable. PERITONEUM: No ascites. VESSELS: Normal caliber abdominal aorta. RETROPERITONEUM/LYMPH NODES: No lymphadenopathy. ABDOMINAL WALL: Within normal limits. BONES: Mild degenerative changes in the spine.  IMPRESSION:  No acute CT findings in the abdomen or pelvis. No CT findings to explain jaundice   Physical exam: Gen: A&Ox3, NAD HEENT: normal outer ears & nose, PERRL, mucus membranes moist, anicteric, no lymphadenopathy CVS: regular rate and rhythm, no murmur Pulm: vesicular breath sounds Abdomen: normal bowel sounds, soft, nontender, no hepatosplenomegaly Legs: no edema, no clubbing Musculoskeletal: normal gait Skin: no rash Neuro: no asterixis, EOMI Psych: normal affect

## 2024-04-13 ENCOUNTER — LABORATORY RESULT (OUTPATIENT)
Age: 59
End: 2024-04-13

## 2024-04-15 LAB
AFP-TM SERPL-MCNC: 2.6 NG/ML
ALBUMIN SERPL ELPH-MCNC: 4.6 G/DL
ALP BLD-CCNC: 89 U/L
ALT SERPL-CCNC: 26 U/L
ANION GAP SERPL CALC-SCNC: 15 MMOL/L
AST SERPL-CCNC: 28 U/L
BASOPHILS # BLD AUTO: 0.04 K/UL
BASOPHILS NFR BLD AUTO: 0.7 %
BILIRUB INDIRECT SERPL-MCNC: 0.3 MG/DL
BILIRUB SERPL-MCNC: 0.4 MG/DL
BUN SERPL-MCNC: 13 MG/DL
CALCIUM SERPL-MCNC: 9.9 MG/DL
CHLORIDE SERPL-SCNC: 103 MMOL/L
CO2 SERPL-SCNC: 23 MMOL/L
CREAT SERPL-MCNC: 0.75 MG/DL
EGFR: 92 ML/MIN/1.73M2
EOSINOPHIL # BLD AUTO: 0.08 K/UL
EOSINOPHIL NFR BLD AUTO: 1.4 %
GLUCOSE SERPL-MCNC: 86 MG/DL
HCT VFR BLD CALC: 40.7 %
HGB BLD-MCNC: 13 G/DL
IMM GRANULOCYTES NFR BLD AUTO: 0 %
INR PPP: 0.94 RATIO
LYMPHOCYTES # BLD AUTO: 1.85 K/UL
LYMPHOCYTES NFR BLD AUTO: 33 %
MAN DIFF?: NORMAL
MCHC RBC-ENTMCNC: 27.8 PG
MCHC RBC-ENTMCNC: 31.9 GM/DL
MCV RBC AUTO: 87 FL
MONOCYTES # BLD AUTO: 0.46 K/UL
MONOCYTES NFR BLD AUTO: 8.2 %
NEUTROPHILS # BLD AUTO: 3.18 K/UL
NEUTROPHILS NFR BLD AUTO: 56.7 %
PLATELET # BLD AUTO: 248 K/UL
POTASSIUM SERPL-SCNC: 4.5 MMOL/L
PROT SERPL-MCNC: 6.9 G/DL
PT BLD: 10.6 SEC
RBC # BLD: 4.68 M/UL
RBC # FLD: 11.9 %
SODIUM SERPL-SCNC: 141 MMOL/L
WBC # FLD AUTO: 5.61 K/UL

## 2024-05-16 ENCOUNTER — APPOINTMENT (OUTPATIENT)
Dept: GASTROENTEROLOGY | Facility: CLINIC | Age: 59
End: 2024-05-16
Payer: COMMERCIAL

## 2024-05-16 VITALS
HEIGHT: 65 IN | SYSTOLIC BLOOD PRESSURE: 110 MMHG | DIASTOLIC BLOOD PRESSURE: 65 MMHG | WEIGHT: 144 LBS | BODY MASS INDEX: 23.99 KG/M2 | HEART RATE: 70 BPM | OXYGEN SATURATION: 98 % | RESPIRATION RATE: 16 BRPM

## 2024-05-16 DIAGNOSIS — R79.89 OTHER SPECIFIED ABNORMAL FINDINGS OF BLOOD CHEMISTRY: ICD-10-CM

## 2024-05-16 PROCEDURE — 99213 OFFICE O/P EST LOW 20 MIN: CPT

## 2024-05-16 NOTE — ASSESSMENT
[FreeTextEntry1] : CHARLES FREEMAN is a 59 year old female with herbal medication induced liver failure (now resolved) presents for follow-up.  Herbal/mushroom induced liver injury/liver failure Resolved.  Labs 4/2024 - tbili 0.4, AST 28, ALT 26, AP 89, INR 0.94. Pt continues to avoid all herbal supplements Advised pt to continue to avoid supplements in the future Follow up PRN

## 2024-05-16 NOTE — PHYSICAL EXAM
[Non-Tender] : non-tender [Smooth] : smooth [General Appearance - Alert] : alert [General Appearance - In No Acute Distress] : in no acute distress [Sclera] : the sclera and conjunctiva were normal [Outer Ear] : the ears and nose were normal in appearance [Oropharynx] : the oropharynx was normal [Neck Appearance] : the appearance of the neck was normal [Bowel Sounds] : normal bowel sounds [Abdomen Soft] : soft [Abdomen Tenderness] : non-tender [] : no hepato-splenomegaly [Abdomen Mass (___ Cm)] : no abdominal mass palpated [Oriented To Time, Place, And Person] : oriented to person, place, and time [Impaired Insight] : insight and judgment were intact [Affect] : the affect was normal [Scleral Icterus] : No Scleral Icterus [Spider Angioma] : No spider angioma(s) were observed [Abdominal  Ascites] : no ascites [Asterixis] : no asterixis observed [Jaundice] : No jaundice [Palmar Erythema] : no Palmar Erythema [Depression] : no depression [Hallucinations] : ~T no ~M hallucinations

## 2024-05-16 NOTE — HISTORY OF PRESENT ILLNESS
[de-identified] : 58/F no PMHx other than mushroom-lion's frances and reishi goldenrod plant intake. Initially admitted to [facility] (12/06- 12/09) with painless jaundice Bili 7 (mostly direct) and mild ALP and transaminitis. Unremarkable MRI/MRCP and CT.Patent Hepatic and Portal Veins.CT Her initial T bili/ AST/ ALT was 7.0/ 110/163,  on (12/09) T Bili 13, AST/ DHT852/83, . Latest BILI 17(direct 15, indirect 2.5) Normal INR, no thrombocytopenia. Patient reported generalized weakness. No other symptoms. None. ABCE Serologies neg. CMV EBV Serologies positive for exposure but viral loads neg VZV, HSV ,Syphilis, HIV -All nEG Autoimmune etc neg Liver biopsy suggestive of drug-induced/herbal induced liver injury.  5/16/24: f/u visit. Labs reviewed w/pt. LFTs have normalized. Labs 4/2024 - tbili 0.4, AST 28, ALT 26, AP 89, INR 0.94. She is no longer taking Ursodiol, stopped in 2/2024. Denies taking any herbal supplements.  2/28/2024 58-year-old patient with herbal medication induced liver failure(now resolved) presents for follow-up. Records reviewed, including notes, labs, imaging, etc. No new liver-related complaints like hematemesis melena jaundice. No hospitalizations or medication changes. Previsit labs reviewed. They were done on 3 February 2024 Bilirubin 2.3 from a previous of more than 17 Resolved liver failure. No symptoms  1/4/2024 Symptomatically better today. I have very strongly advised her to refrain from taking any herbal products. Labs results prognosis discussed extensively with the patient and her  and her mother who is an RN  Liver Biopsy : Cholestasis- quite a bit pocket of steroid laden macrophages which indicate liver injury Portal tract inflammation.rare eos, rare plasma cells.rare luymphocytes. No interface hepatitis lobular inflammation -mild. Bile duct reactive mild No Hep Artery damage seen. No fibrosis  Hospital Course: Discharge Date 09-Dec-2023 Admission Date 07-Dec-2023 05:03 Reason for Admission Elevated LFTs Hospital Course  59 y/o F with no known PMH admitted with painless jaundice. No definitive etiology for hepatitis identified thus far. Has been treated empirically with NAC and given stable LFTs is planned for [facility] home wiuth outpt f/u with PCP and GI  Med Reconciliation: Medication Reconciliation Status Admission Reconciliation is Completed Discharge Reconciliation is Completed Discharge Medications hydrOXYzine hydrochloride 25 mg oral tablet: 1 tab(s) orally every 8 hours as needed for Itching ,  LFTs remain elevated but stable, INR normal Positive EBV titers, possible EBV reactivation Additional labs still pending Recent viral illness Herbal supplements (goldenrod), forages for mushrooms (lion's frances and reishi) CT A/P 12/7/23- no acute findings- normal liver, normal caliber bile ducts Acute hepatitis panel negative NAC completed, Ok to d/c with follow up in office with Dr. Chacko  ACC: [id number] EXAM: CT ABDOMEN AND PELVIS IC ORDERED BY: JOSÉ MASSEY PROCEDURE DATE: 12/07/2023  INTERPRETATION: CLINICAL INFORMATION: 58 years old. Female. jaundice. FINDINGS: LOWER CHEST: Within normal limits. LIVER: Within normal limits. BILE DUCTS: Normal caliber. GALLBLADDER: Within normal limits. SPLEEN: Within normal limits. PANCREAS: Within normal limits. ADRENALS: Within normal limits. KIDNEYS/URETERS: Enhance symmetrically. No hydronephrosis. A subcentimeter hypodensity in the left kidney is too small to characterize.  BLADDER: Within normal limits. REPRODUCTIVE ORGANS: Uterus and adnexa are grossly unremarkable.  BOWEL: No bowel obstruction. Appendix is unremarkable. PERITONEUM: No ascites. VESSELS: Normal caliber abdominal aorta. RETROPERITONEUM/LYMPH NODES: No lymphadenopathy. ABDOMINAL WALL: Within normal limits. BONES: Mild degenerative changes in the spine.  IMPRESSION: No acute CT findings in the abdomen or pelvis. No CT findings to explain jaundice

## 2024-07-12 NOTE — ED ADULT NURSE NOTE - AS PAIN REST
Goal Outcome Evaluation:  Plan of Care Reviewed With: patient        Progress: declining  Outcome Evaluation: Bed mobility mod x1 w/ lots of cues and using draw sheet. once sitting EOB pt leaning hard to the Right and posteriorly. worked on sitting balance. once pt able to sit. Pt tolerated BLE strengthening ex's has difficulty w/ LLE due to increased knee pain. sit-stand mult attempts mod x1 before pt able to get upright. Pt amb around bed-door, followed w/ chair. very unsafe gait mod x1 help w/ AD and cues for RLE step through, pt had to sit due to weakness and safety before amb back around bed to chair. Pt will need rehab upon d/c for cont strength, mobiltiy and safety.,                                5 (moderate pain)